# Patient Record
Sex: FEMALE | Race: WHITE | HISPANIC OR LATINO
[De-identification: names, ages, dates, MRNs, and addresses within clinical notes are randomized per-mention and may not be internally consistent; named-entity substitution may affect disease eponyms.]

---

## 2017-02-16 ENCOUNTER — APPOINTMENT (OUTPATIENT)
Dept: DERMATOLOGY | Facility: CLINIC | Age: 47
End: 2017-02-16

## 2017-05-02 ENCOUNTER — APPOINTMENT (OUTPATIENT)
Dept: DERMATOLOGY | Facility: CLINIC | Age: 47
End: 2017-05-02

## 2017-11-15 ENCOUNTER — APPOINTMENT (OUTPATIENT)
Dept: COLORECTAL SURGERY | Facility: CLINIC | Age: 47
End: 2017-11-15
Payer: COMMERCIAL

## 2017-11-15 VITALS
WEIGHT: 147 LBS | BODY MASS INDEX: 27.75 KG/M2 | DIASTOLIC BLOOD PRESSURE: 94 MMHG | HEIGHT: 61 IN | SYSTOLIC BLOOD PRESSURE: 150 MMHG | RESPIRATION RATE: 14 BRPM | HEART RATE: 85 BPM | OXYGEN SATURATION: 99 %

## 2017-11-15 PROCEDURE — 99242 OFF/OP CONSLTJ NEW/EST SF 20: CPT

## 2017-11-28 ENCOUNTER — APPOINTMENT (OUTPATIENT)
Dept: CT IMAGING | Facility: CLINIC | Age: 47
End: 2017-11-28
Payer: COMMERCIAL

## 2017-11-28 ENCOUNTER — OUTPATIENT (OUTPATIENT)
Dept: OUTPATIENT SERVICES | Facility: HOSPITAL | Age: 47
LOS: 1 days | End: 2017-11-28
Payer: COMMERCIAL

## 2017-11-28 DIAGNOSIS — Z00.8 ENCOUNTER FOR OTHER GENERAL EXAMINATION: ICD-10-CM

## 2017-11-28 PROCEDURE — 74177 CT ABD & PELVIS W/CONTRAST: CPT

## 2017-11-28 PROCEDURE — 74177 CT ABD & PELVIS W/CONTRAST: CPT | Mod: 26

## 2017-12-04 ENCOUNTER — APPOINTMENT (OUTPATIENT)
Dept: COLORECTAL SURGERY | Facility: CLINIC | Age: 47
End: 2017-12-04

## 2018-01-22 ENCOUNTER — TRANSCRIPTION ENCOUNTER (OUTPATIENT)
Age: 48
End: 2018-01-22

## 2019-12-02 ENCOUNTER — APPOINTMENT (OUTPATIENT)
Dept: HEART AND VASCULAR | Facility: CLINIC | Age: 49
End: 2019-12-02
Payer: COMMERCIAL

## 2019-12-02 VITALS
HEART RATE: 86 BPM | BODY MASS INDEX: 27 KG/M2 | TEMPERATURE: 98.4 F | OXYGEN SATURATION: 98 % | SYSTOLIC BLOOD PRESSURE: 110 MMHG | HEIGHT: 61 IN | WEIGHT: 142.99 LBS | DIASTOLIC BLOOD PRESSURE: 62 MMHG

## 2019-12-02 PROCEDURE — 99204 OFFICE O/P NEW MOD 45 MIN: CPT

## 2019-12-02 PROCEDURE — 93000 ELECTROCARDIOGRAM COMPLETE: CPT

## 2019-12-02 RX ORDER — LOSARTAN POTASSIUM 25 MG/1
25 TABLET, FILM COATED ORAL DAILY
Qty: 90 | Refills: 0 | Status: ACTIVE | COMMUNITY

## 2019-12-02 NOTE — PHYSICAL EXAM
[General Appearance - Well Developed] : well developed [Normal Appearance] : normal appearance [Well Groomed] : well groomed [General Appearance - Well Nourished] : well nourished [No Deformities] : no deformities [General Appearance - In No Acute Distress] : no acute distress [Normal Oral Mucosa] : normal oral mucosa [No Oral Pallor] : no oral pallor [No Oral Cyanosis] : no oral cyanosis [Normal Jugular Venous A Waves Present] : normal jugular venous A waves present [Normal Jugular Venous V Waves Present] : normal jugular venous V waves present [No Jugular Venous Dennis A Waves] : no jugular venous dennis A waves [Heart Rate And Rhythm] : heart rate and rhythm were normal [Heart Sounds] : normal S1 and S2 [Murmurs] : no murmurs present [Respiration, Rhythm And Depth] : normal respiratory rhythm and effort [Auscultation Breath Sounds / Voice Sounds] : lungs were clear to auscultation bilaterally [Exaggerated Use Of Accessory Muscles For Inspiration] : no accessory muscle use [Abdomen Tenderness] : non-tender [Abdomen Soft] : soft [Abdomen Mass (___ Cm)] : no abdominal mass palpated [Abnormal Walk] : normal gait [Gait - Sufficient For Exercise Testing] : the gait was sufficient for exercise testing [Skin Color & Pigmentation] : normal skin color and pigmentation [] : no rash [No Venous Stasis] : no venous stasis [Skin Lesions] : no skin lesions [No Skin Ulcers] : no skin ulcer [No Xanthoma] : no  xanthoma was observed [Affect] : the affect was normal [Oriented To Time, Place, And Person] : oriented to person, place, and time [No Anxiety] : not feeling anxious [Mood] : the mood was normal

## 2019-12-02 NOTE — REASON FOR VISIT
[Initial Evaluation] : an initial evaluation of [Coronary Artery Disease] : coronary artery disease [Peripheral Vascular Disease] : peripheral vascular disease

## 2019-12-02 NOTE — DISCUSSION/SUMMARY
[FreeTextEntry1] : The number of diagnostic and/or management options include:\par \par Chest pain - +carotid disease and fhx, will proceed to CCTA cor and 2d echo to eval anatomy and need for escalated work up\par \par PAD- asymp carotid stenosis, vasc cario referral, continue station and asa therapy\par \par HTN - the patient BP is at goal, ambi bp monitor at goal, no jvd/rale/edema on exam, continue current medicines\par \par CV Prevention - \par ·	Advised SBP guidelines based on ACC/AHA and SPRINT trial increased CAD PAD and mortality \par ·	Assessed willingness to attempt - contemplation stage\par ·	Agree to no added salt and added sugar diet  - prevention medicine referral, nutritionist\par ·	Assist with resources provided - prevention medicine referral, nutritionist, individual counseling\par ·	Arrange ·	Follow-up arranged - next scheduled visit\par \par Prescription drug management: no change\par Review of medicine test: EKG\par Independent review of images: CXR, CT Scan, Stress tests, as applicable\par Review of clinical lab tests: lipid profile, hgA1c, cbc, bnp, metabolic panels, as applicable\par \par \par \par

## 2019-12-02 NOTE — HISTORY OF PRESENT ILLNESS
[FreeTextEntry1] : \par 49 F with known non obstructive carotid disease, here for second opinion. c/o occasional sscp\par \par pnd - no\par orthopnea - no\par leg edema - no\par syncope - no\par dizziness - no\par palpitations - no\par leg pain - no\par SOB - no\par chest pain - YES\par \par location: chest\par duration: seconds\par  modifying factors: none\par timing: spontaeous\par severity: 4/10\par \par EKG NSR normal segmetns and intervals

## 2019-12-26 ENCOUNTER — APPOINTMENT (OUTPATIENT)
Dept: CT IMAGING | Facility: HOSPITAL | Age: 49
End: 2019-12-26
Payer: COMMERCIAL

## 2020-01-12 ENCOUNTER — FORM ENCOUNTER (OUTPATIENT)
Age: 50
End: 2020-01-12

## 2020-01-13 ENCOUNTER — OUTPATIENT (OUTPATIENT)
Dept: OUTPATIENT SERVICES | Facility: HOSPITAL | Age: 50
LOS: 1 days | End: 2020-01-13
Payer: COMMERCIAL

## 2020-01-13 ENCOUNTER — APPOINTMENT (OUTPATIENT)
Dept: CT IMAGING | Facility: HOSPITAL | Age: 50
End: 2020-01-13
Payer: COMMERCIAL

## 2020-01-13 PROCEDURE — 75574 CT ANGIO HRT W/3D IMAGE: CPT

## 2020-01-13 PROCEDURE — 75574 CT ANGIO HRT W/3D IMAGE: CPT | Mod: 26

## 2020-01-15 ENCOUNTER — APPOINTMENT (OUTPATIENT)
Dept: HEART AND VASCULAR | Facility: CLINIC | Age: 50
End: 2020-01-15
Payer: COMMERCIAL

## 2020-01-15 VITALS
WEIGHT: 142.99 LBS | BODY MASS INDEX: 27 KG/M2 | OXYGEN SATURATION: 99 % | HEART RATE: 77 BPM | HEIGHT: 61 IN | DIASTOLIC BLOOD PRESSURE: 66 MMHG | SYSTOLIC BLOOD PRESSURE: 112 MMHG | TEMPERATURE: 98 F

## 2020-01-15 PROCEDURE — 99214 OFFICE O/P EST MOD 30 MIN: CPT

## 2020-01-15 PROCEDURE — 93000 ELECTROCARDIOGRAM COMPLETE: CPT

## 2020-01-16 LAB
25(OH)D3 SERPL-MCNC: 22 NG/ML
ALBUMIN SERPL ELPH-MCNC: 4.7 G/DL
ALP BLD-CCNC: 69 U/L
ALT SERPL-CCNC: 15 U/L
ANION GAP SERPL CALC-SCNC: 13 MMOL/L
APTT BLD: 28.3 SEC
AST SERPL-CCNC: 13 U/L
BASOPHILS # BLD AUTO: 0.06 K/UL
BASOPHILS NFR BLD AUTO: 0.6 %
BILIRUB SERPL-MCNC: 0.5 MG/DL
BUN SERPL-MCNC: 8 MG/DL
CALCIUM SERPL-MCNC: 9.4 MG/DL
CHLORIDE SERPL-SCNC: 105 MMOL/L
CHOLEST SERPL-MCNC: 109 MG/DL
CHOLEST/HDLC SERPL: 2.8 RATIO
CO2 SERPL-SCNC: 21 MMOL/L
CREAT SERPL-MCNC: 0.43 MG/DL
EOSINOPHIL # BLD AUTO: 0.28 K/UL
EOSINOPHIL NFR BLD AUTO: 3 %
ESTIMATED AVERAGE GLUCOSE: 114 MG/DL
GLUCOSE SERPL-MCNC: 82 MG/DL
HBA1C MFR BLD HPLC: 5.6 %
HCT VFR BLD CALC: 38.1 %
HDLC SERPL-MCNC: 39 MG/DL
HGB BLD-MCNC: 12.1 G/DL
IMM GRANULOCYTES NFR BLD AUTO: 0.2 %
INR PPP: 1.04 RATIO
LDLC SERPL CALC-MCNC: 42 MG/DL
LYMPHOCYTES # BLD AUTO: 2.58 K/UL
LYMPHOCYTES NFR BLD AUTO: 27.3 %
MAGNESIUM SERPL-MCNC: 2 MG/DL
MAN DIFF?: NORMAL
MCHC RBC-ENTMCNC: 29.7 PG
MCHC RBC-ENTMCNC: 31.8 GM/DL
MCV RBC AUTO: 93.4 FL
MONOCYTES # BLD AUTO: 0.66 K/UL
MONOCYTES NFR BLD AUTO: 7 %
NEUTROPHILS # BLD AUTO: 5.84 K/UL
NEUTROPHILS NFR BLD AUTO: 61.9 %
PLATELET # BLD AUTO: 532 K/UL
POTASSIUM SERPL-SCNC: 4.3 MMOL/L
PROT SERPL-MCNC: 6.7 G/DL
PT BLD: 11.9 SEC
RBC # BLD: 4.08 M/UL
RBC # FLD: 13.2 %
SODIUM SERPL-SCNC: 140 MMOL/L
TRIGL SERPL-MCNC: 137 MG/DL
TSH SERPL-ACNC: 0.55 UIU/ML
WBC # FLD AUTO: 9.44 K/UL

## 2020-01-17 NOTE — HISTORY OF PRESENT ILLNESS
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  Presents for initial vascular evaluation for carotid and vertebral artery plaque noted on outside MRI 2018.  Had a carotid duplex in Aug 2018 which revealed no significant stenosis (per report, no images).  Had a CCTA recently showed 30-40% left main disease.  Agatson score 68.  No exertional CP/SOB/leg pain. Doesn't exercise but walks ~ 5 miles/day without limitations.  \par \par Hx VTE, CVA, MI\par FHx: father CAD  age 64\par Non smoker, Etoh social\par NKDA\par

## 2020-01-17 NOTE — ASSESSMENT
[FreeTextEntry1] : 49 F\par \par CAD - asymptomatic LM disease 30-40%\par Carotid Plaque\par \par plan: \par - increase lipitor to 40mg\par - cont aspirin\par - check ECHO \par - check carotid dopplers\par - get outside report of MRA

## 2020-02-19 ENCOUNTER — APPOINTMENT (OUTPATIENT)
Dept: HEART AND VASCULAR | Facility: CLINIC | Age: 50
End: 2020-02-19

## 2020-10-12 ENCOUNTER — APPOINTMENT (OUTPATIENT)
Dept: COLORECTAL SURGERY | Facility: CLINIC | Age: 50
End: 2020-10-12

## 2021-04-13 ENCOUNTER — NON-APPOINTMENT (OUTPATIENT)
Age: 51
End: 2021-04-13

## 2021-04-13 ENCOUNTER — APPOINTMENT (OUTPATIENT)
Dept: HEART AND VASCULAR | Facility: CLINIC | Age: 51
End: 2021-04-13
Payer: COMMERCIAL

## 2021-04-13 VITALS
SYSTOLIC BLOOD PRESSURE: 124 MMHG | BODY MASS INDEX: 27 KG/M2 | DIASTOLIC BLOOD PRESSURE: 80 MMHG | OXYGEN SATURATION: 99 % | HEIGHT: 61 IN | HEART RATE: 94 BPM | WEIGHT: 142.99 LBS | TEMPERATURE: 98 F

## 2021-04-13 DIAGNOSIS — J45.909 UNSPECIFIED ASTHMA, UNCOMPLICATED: ICD-10-CM

## 2021-04-13 DIAGNOSIS — I25.10 ATHEROSCLEROTIC HEART DISEASE OF NATIVE CORONARY ARTERY W/OUT ANGINA PECTORIS: ICD-10-CM

## 2021-04-13 DIAGNOSIS — L93.0 DISCOID LUPUS ERYTHEMATOSUS: ICD-10-CM

## 2021-04-13 DIAGNOSIS — I65.29 OCCLUSION AND STENOSIS OF UNSPECIFIED CAROTID ARTERY: ICD-10-CM

## 2021-04-13 PROCEDURE — 93000 ELECTROCARDIOGRAM COMPLETE: CPT

## 2021-04-13 PROCEDURE — 99213 OFFICE O/P EST LOW 20 MIN: CPT

## 2021-04-13 PROCEDURE — 99072 ADDL SUPL MATRL&STAF TM PHE: CPT

## 2021-04-13 NOTE — HISTORY OF PRESENT ILLNESS
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  Presents for initial vascular evaluation for carotid and vertebral artery plaque noted on outside MRI 2018.  Had a carotid duplex in Aug 2018 which revealed no significant stenosis (per report, no images).  Had a CCTA 20 showed 30-40% left main disease.  Agatson score 68.  No exertional CP/SOB/leg pain. Doesn't exercise but walks ~ 5 miles/day without limitations.  \par \par 21: had mild covid infection last month.  notes some recent weight loss, walking a lot more.  no cp or sob.  \par \par Hx VTE, CVA, MI\par FHx: father CAD  age 64\par Non smoker, Etoh social\par NKDA\par

## 2021-04-13 NOTE — PHYSICAL EXAM
[General Appearance - Well Developed] : well developed [Normal Appearance] : normal appearance [Well Groomed] : well groomed [General Appearance - Well Nourished] : well nourished [No Deformities] : no deformities [General Appearance - In No Acute Distress] : no acute distress [Normal Conjunctiva] : the conjunctiva exhibited no abnormalities [Eyelids - No Xanthelasma] : the eyelids demonstrated no xanthelasmas [Normal Oral Mucosa] : normal oral mucosa [No Oral Pallor] : no oral pallor [No Oral Cyanosis] : no oral cyanosis [Normal Jugular Venous A Waves Present] : normal jugular venous A waves present [Normal Jugular Venous V Waves Present] : normal jugular venous V waves present [No Jugular Venous Dennis A Waves] : no jugular venous dennis A waves [Respiration, Rhythm And Depth] : normal respiratory rhythm and effort [Exaggerated Use Of Accessory Muscles For Inspiration] : no accessory muscle use [Auscultation Breath Sounds / Voice Sounds] : lungs were clear to auscultation bilaterally [Heart Rate And Rhythm] : heart rate and rhythm were normal [Heart Sounds] : normal S1 and S2 [Murmurs] : no murmurs present [Abdomen Soft] : soft [Abdomen Tenderness] : non-tender [Abdomen Mass (___ Cm)] : no abdominal mass palpated [Abnormal Walk] : normal gait [Gait - Sufficient For Exercise Testing] : the gait was sufficient for exercise testing [Nail Clubbing] : no clubbing of the fingernails [Cyanosis, Localized] : no localized cyanosis [Petechial Hemorrhages (___cm)] : no petechial hemorrhages [Skin Color & Pigmentation] : normal skin color and pigmentation [] : no rash [No Venous Stasis] : no venous stasis [Skin Lesions] : no skin lesions [No Skin Ulcers] : no skin ulcer [No Xanthoma] : no  xanthoma was observed

## 2021-04-13 NOTE — ASSESSMENT
[FreeTextEntry1] : 50 F\par \par CAD - asymptomatic LM disease 30-40% on CCTA 1/13/20\par Carotid Plaque\par HTN\par EKG NSR\par \par plan: \par - lipitor 40mg\par - cont aspirin\par - cont losartan 25mg \par - obtain CT FFR for mild/mod LM disease \par - check ECHO \par - check carotid dopplers\par

## 2021-04-16 ENCOUNTER — NON-APPOINTMENT (OUTPATIENT)
Age: 51
End: 2021-04-16

## 2021-12-25 ENCOUNTER — EMERGENCY (EMERGENCY)
Facility: HOSPITAL | Age: 51
LOS: 1 days | Discharge: LEFT BEFORE DISCHARGE | End: 2021-12-25
Attending: EMERGENCY MEDICINE
Payer: COMMERCIAL

## 2021-12-25 VITALS
HEART RATE: 86 BPM | HEIGHT: 61 IN | TEMPERATURE: 99 F | WEIGHT: 141.1 LBS | SYSTOLIC BLOOD PRESSURE: 149 MMHG | OXYGEN SATURATION: 99 % | DIASTOLIC BLOOD PRESSURE: 93 MMHG | RESPIRATION RATE: 17 BRPM

## 2021-12-25 PROCEDURE — 99284 EMERGENCY DEPT VISIT MOD MDM: CPT

## 2021-12-25 PROCEDURE — 99284 EMERGENCY DEPT VISIT MOD MDM: CPT | Mod: 25

## 2021-12-25 PROCEDURE — 93971 EXTREMITY STUDY: CPT | Mod: 26,RT

## 2021-12-25 PROCEDURE — 93971 EXTREMITY STUDY: CPT

## 2021-12-25 RX ORDER — IBUPROFEN 200 MG
600 TABLET ORAL ONCE
Refills: 0 | Status: COMPLETED | OUTPATIENT
Start: 2021-12-25 | End: 2021-12-25

## 2021-12-25 RX ADMIN — Medication 600 MILLIGRAM(S): at 13:56

## 2021-12-25 NOTE — ED PROVIDER NOTE - PHYSICAL EXAMINATION
General: In NAD, non-toxic/well-appearing; well nourished/developed.  Skin: No rashes or lesions. Warm, dry, color normal for race. See MSK.   Head: Normocephalic/atraumatic.   Eyes: Sclera anicteric, conjunctivae clear b/l. PERRLA, EOMI.  Neck: Supple, FROM.   Cardio: Rate and rhythm regular. No audible murmur, gallop, or rub.  PV: No edema of feet/ankles. No calf swelling/tenderness. Pulses: b/l 2+ DP, PT. Capillary refill <2 seconds.  Resp: Breath sounds vesicular, symmetrical and without rales, rhonchi or wheezing b/l.  MSK: MAEx4. FROM. No bruising. +Spider vein to posterior lateral right knee. Approx. 1cm x 1.cm  firm, tender mass to posterior lateral right knee.   Neuro: A&Ox3. No motor/sensory deficits. General: In NAD, non-toxic/well-appearing; well nourished/developed.  Skin: No rashes or lesions. Warm, dry, color normal for race. See MSK.   Head: Normocephalic/atraumatic.   Eyes: Sclera anicteric, conjunctivae clear b/l. PERRLA, EOMI.  Neck: Supple, FROM.   Cardio: Rate and rhythm regular. No audible murmur, gallop, or rub.  PV: No edema of feet/ankles. No calf swelling/tenderness. Pulses: b/l 2+ DP, PT. Capillary refill <2 seconds.  Resp: Breath sounds vesicular, symmetrical and without rales, rhonchi or wheezing b/l.  MSK: MAEx4. FROM. No bruising. +Spider vein to posterior lateral right knee. Approx. 1cm x 1.cm firm, tender mass to posterior lateral right knee. No fluctuance, erythema, swelling.   Neuro: A&Ox3. No motor/sensory deficits. General: In NAD, non-toxic/well-appearing; well nourished/developed.  Skin: No rashes or lesions. Warm, dry, color normal for race. See MSK.   Head: Normocephalic/atraumatic.   Eyes: Sclera anicteric, conjunctivae clear b/l. PERRLA, EOMI.  Neck: Supple, FROM.   Cardio: Rate and rhythm regular. No audible murmur, gallop, or rub.  PV: No edema of feet/ankles. No calf swelling/tenderness. Pulses: b/l 2+ DP, PT. Capillary refill <2 seconds.  Resp: Breath sounds vesicular, symmetrical and without rales, rhonchi or wheezing b/l.  MSK: MAEx4. FROM. No bruising. +Spider vein to posterior lateral right knee. Approx. 1cm x 1.cm firm, tender mass to posterior lateral right knee. No fluctuance, erythema, swelling.   Neuro: A&Ox3. No motor/sensory deficits. Ambulatory.

## 2021-12-25 NOTE — ED PROVIDER NOTE - CLINICAL SUMMARY MEDICAL DECISION MAKING FREE TEXT BOX
50 yo female PMHx asthma, chronic back pain, GERD, incisional hernia, small bowel perforation, vein striping presents to ED c/o bump behind right knee x1 day. No abscess, erythema, bruising. US negative. Patient to be discharged. Patient provided verbal/written discharge instructions and return precautions. Patient expresses understanding and agreement.

## 2021-12-25 NOTE — ED PROVIDER NOTE - PATIENT PORTAL LINK FT
You can access the FollowMyHealth Patient Portal offered by [facility] by registering at the following website: [email or web address]. By joining [facility]’s FollowMyHealth portal, you will also be able to view your health information using other applications (apps) compatible with our system.

## 2021-12-25 NOTE — ED PROVIDER NOTE - OBJECTIVE STATEMENT
52 yo female PMHx asthma, chronic back pain, GERD, incisional hernia, small bowel perforation, vein striping presents to ED c/o bump behind right knee x1 day. Associated with bruise. Did not self medicate PTA. Made worse with movement. No further complaints at this time.   Denies recent travel, OCP/hormone use, fevers, trauma.

## 2021-12-25 NOTE — ED PROVIDER NOTE - NSICDXPASTMEDICALHX_GEN_ALL_CORE_FT
PAST MEDICAL HISTORY:  Asthma Mild    Chronic back pain Lumbar. Treated every 3 months by Neurosurgeon. Medically managed    Discoid lupus dx:  ' 2008    Diverticulosis     Dry eyes     Fibroid uterus ' 2004    Gastric ulcer ' 99.  Healed    GERD (gastroesophageal reflux disease)     Herniated lumbar intervertebral disc ' 2012    Incisional hernia     Metatarsal fracture ' 1997:  Left Foot    Postoperative wound infection dx: 11/2013:   Infected Mesh ( s/p Umbilical Hernia Repair: ' 04)    Scar tissue dx: 10/2013:  + Scar Tissue @ site of prior Umbilical Hernia Repair    Small bowel perforation dx:  11/2013    Umbilical hernia ' 2004

## 2021-12-25 NOTE — ED PROVIDER NOTE - NSFOLLOWUPINSTRUCTIONS_ED_ALL_ED_FT
- Ibuprofen 600mg every 6 hours as needed for pain.  - Acetaminophen 650mg every 6 hours as needed for pain.   - Please bring all documentation from your ED visit to any related future follow up appointment.  - Please call to schedule follow up appointment with your primary care physician within 24-48 hours.  - Please seek immediate medical attention or return to the ED for any new/worsening, signs/symptoms, or concerns.    Feel better!

## 2021-12-25 NOTE — ED PROVIDER NOTE - NSICDXPASTSURGICALHX_GEN_ALL_CORE_FT
PAST SURGICAL HISTORY:  Benign Breast Biopsy ' :  Left    History of  /  /  2004    Metatarsal fracture ' :  Closed Reduction Left Metatarsal Fracture    S/P colonoscopy ' .  dx: Diverticulosis    S/P hernia repair ':  Umbilical Hernia Repair with Mesh    S/P laminectomy ' :  Lumbar Laminectomy    S/P myomectomy 2000    S/P MICHAEL (total abdominal hysterectomy) ' . Benign    S/P tonsillectomy age 20    Scar tissue 10/2013:  Removal Scar Tissue @ site of prior Umbilica Hernia Repair

## 2021-12-25 NOTE — ED PROVIDER NOTE - CARE PROVIDER_API CALL
Gonzalez Stephen)  Orthopaedic Surgery  [address] [place] [address]  Phone: [phone]  Fax: [phone]  Follow Up Time:

## 2021-12-25 NOTE — ED PROVIDER NOTE - PROGRESS NOTE DETAILS
HILARY Mariano: Unable to locate patient despite multiple attempts. Patient did not receive US results or follow up care. Patient eloped, no IV in place.

## 2022-03-21 ENCOUNTER — NON-APPOINTMENT (OUTPATIENT)
Age: 52
End: 2022-03-21

## 2022-03-21 ENCOUNTER — APPOINTMENT (OUTPATIENT)
Dept: FAMILY MEDICINE | Facility: CLINIC | Age: 52
End: 2022-03-21
Payer: COMMERCIAL

## 2022-03-21 VITALS
BODY MASS INDEX: 28.32 KG/M2 | HEART RATE: 84 BPM | OXYGEN SATURATION: 98 % | WEIGHT: 150 LBS | RESPIRATION RATE: 16 BRPM | TEMPERATURE: 97.9 F | DIASTOLIC BLOOD PRESSURE: 74 MMHG | HEIGHT: 61 IN | SYSTOLIC BLOOD PRESSURE: 116 MMHG

## 2022-03-21 PROCEDURE — 00018S: CUSTOM

## 2022-03-21 PROCEDURE — 00012S: CUSTOM

## 2022-03-21 PROCEDURE — 00011S: CUSTOM

## 2022-03-21 PROCEDURE — 00002S: CUSTOM

## 2022-03-21 RX ORDER — BUDESONIDE AND FORMOTEROL FUMARATE DIHYDRATE 160; 4.5 UG/1; UG/1
160-4.5 AEROSOL RESPIRATORY (INHALATION)
Qty: 10 | Refills: 0 | Status: ACTIVE | COMMUNITY
Start: 2021-12-24

## 2022-03-21 RX ORDER — TRIAMCINOLONE ACETONIDE 1 MG/G
0.1 OINTMENT TOPICAL
Qty: 80 | Refills: 0 | Status: ACTIVE | COMMUNITY
Start: 2022-02-17

## 2022-11-02 ENCOUNTER — APPOINTMENT (OUTPATIENT)
Dept: COLORECTAL SURGERY | Facility: CLINIC | Age: 52
End: 2022-11-02

## 2022-11-14 ENCOUNTER — APPOINTMENT (OUTPATIENT)
Dept: COLORECTAL SURGERY | Facility: CLINIC | Age: 52
End: 2022-11-14

## 2023-04-03 ENCOUNTER — APPOINTMENT (OUTPATIENT)
Dept: FAMILY MEDICINE | Facility: CLINIC | Age: 53
End: 2023-04-03
Payer: COMMERCIAL

## 2023-04-03 ENCOUNTER — NON-APPOINTMENT (OUTPATIENT)
Age: 53
End: 2023-04-03

## 2023-04-03 VITALS
WEIGHT: 9.38 LBS | BODY MASS INDEX: 1.77 KG/M2 | HEART RATE: 100 BPM | RESPIRATION RATE: 14 BRPM | OXYGEN SATURATION: 98 % | SYSTOLIC BLOOD PRESSURE: 110 MMHG | DIASTOLIC BLOOD PRESSURE: 70 MMHG

## 2023-04-03 DIAGNOSIS — Z83.49 FAMILY HISTORY OF OTHER ENDOCRINE, NUTRITIONAL AND METABOLIC DISEASES: ICD-10-CM

## 2023-04-03 DIAGNOSIS — Z83.3 FAMILY HISTORY OF DIABETES MELLITUS: ICD-10-CM

## 2023-04-03 DIAGNOSIS — Z78.9 OTHER SPECIFIED HEALTH STATUS: ICD-10-CM

## 2023-04-03 DIAGNOSIS — Z81.8 FAMILY HISTORY OF OTHER MENTAL AND BEHAVIORAL DISORDERS: ICD-10-CM

## 2023-04-03 DIAGNOSIS — Z00.00 ENCOUNTER FOR GENERAL ADULT MEDICAL EXAMINATION W/OUT ABNORMAL FINDINGS: ICD-10-CM

## 2023-04-03 DIAGNOSIS — Z82.49 FAMILY HISTORY OF ISCHEMIC HEART DISEASE AND OTHER DISEASES OF THE CIRCULATORY SYSTEM: ICD-10-CM

## 2023-04-03 PROCEDURE — 00002S: CUSTOM

## 2023-04-03 PROCEDURE — 00009S: CUSTOM

## 2023-04-03 PROCEDURE — 00011S: CUSTOM

## 2023-04-03 PROCEDURE — 00012S: CUSTOM

## 2023-04-03 PROCEDURE — 00018S: CUSTOM

## 2023-04-03 RX ORDER — ASPIRIN 81 MG/1
81 TABLET, COATED ORAL
Qty: 30 | Refills: 0 | Status: COMPLETED | COMMUNITY
Start: 2022-01-25 | End: 2023-04-03

## 2023-04-03 RX ORDER — CEPHALEXIN 500 MG/1
500 CAPSULE ORAL
Qty: 10 | Refills: 0 | Status: COMPLETED | COMMUNITY
Start: 2022-01-13 | End: 2023-04-03

## 2023-04-03 RX ORDER — PREDNISONE 20 MG/1
20 TABLET ORAL
Qty: 10 | Refills: 0 | Status: COMPLETED | COMMUNITY
Start: 2023-03-05

## 2023-04-03 RX ORDER — OSELTAMIVIR PHOSPHATE 75 MG/1
75 CAPSULE ORAL
Qty: 10 | Refills: 0 | Status: COMPLETED | COMMUNITY
Start: 2022-12-19

## 2023-04-03 RX ORDER — MELOXICAM 15 MG/1
15 TABLET ORAL
Qty: 30 | Refills: 0 | Status: COMPLETED | COMMUNITY
Start: 2022-10-13

## 2023-04-03 RX ORDER — METHYLPREDNISOLONE 4 MG/1
4 TABLET ORAL
Qty: 21 | Refills: 0 | Status: COMPLETED | COMMUNITY
Start: 2021-12-28 | End: 2023-04-03

## 2023-04-03 RX ORDER — AZITHROMYCIN 500 MG/1
500 TABLET, FILM COATED ORAL
Qty: 5 | Refills: 0 | Status: COMPLETED | COMMUNITY
Start: 2021-11-06 | End: 2023-04-03

## 2023-04-03 RX ORDER — METAXALONE 800 MG/1
800 TABLET ORAL
Qty: 45 | Refills: 0 | Status: COMPLETED | COMMUNITY
Start: 2022-10-14

## 2023-04-03 RX ORDER — COVID-19 ANTIGEN TEST
KIT MISCELLANEOUS
Qty: 8 | Refills: 0 | Status: COMPLETED | COMMUNITY
Start: 2023-03-22

## 2023-04-03 RX ORDER — CLOBETASOL PROPIONATE 0.5 MG/G
0.05 OINTMENT TOPICAL
Qty: 45 | Refills: 0 | Status: COMPLETED | COMMUNITY
Start: 2022-12-27

## 2023-04-03 RX ORDER — AMOXICILLIN AND CLAVULANATE POTASSIUM 875; 125 MG/1; MG/1
875-125 TABLET, COATED ORAL
Qty: 28 | Refills: 0 | Status: COMPLETED | COMMUNITY
Start: 2021-12-28 | End: 2023-04-03

## 2023-04-03 RX ORDER — IBUPROFEN 800 MG/1
800 TABLET, FILM COATED ORAL
Qty: 30 | Refills: 0 | Status: COMPLETED | COMMUNITY
Start: 2021-11-06 | End: 2023-04-03

## 2023-04-03 RX ORDER — BETAMETHASONE VALERATE 1 MG/G
0.1 CREAM TOPICAL
Qty: 60 | Refills: 0 | Status: COMPLETED | COMMUNITY
Start: 2022-03-15 | End: 2023-04-03

## 2023-04-03 RX ORDER — ASPIRIN 81 MG
81 TABLET, DELAYED RELEASE (ENTERIC COATED) ORAL DAILY
Refills: 0 | Status: COMPLETED | COMMUNITY
End: 2023-04-03

## 2023-04-03 RX ORDER — GABAPENTIN 100 MG/1
100 CAPSULE ORAL
Qty: 90 | Refills: 0 | Status: ACTIVE | COMMUNITY
Start: 2022-10-18

## 2023-04-03 RX ORDER — FLUTICASONE FUROATE AND VILANTEROL TRIFENATATE 100; 25 UG/1; UG/1
100-25 POWDER RESPIRATORY (INHALATION)
Refills: 0 | Status: COMPLETED | COMMUNITY
End: 2023-04-03

## 2023-04-03 RX ORDER — FLUCONAZOLE 150 MG/1
150 TABLET ORAL
Qty: 2 | Refills: 0 | Status: COMPLETED | COMMUNITY
Start: 2021-12-28 | End: 2023-04-03

## 2023-04-03 RX ORDER — DICLOFENAC SODIUM 50 MG/1
50 TABLET, DELAYED RELEASE ORAL
Qty: 30 | Refills: 0 | Status: ACTIVE | COMMUNITY
Start: 2023-03-27

## 2023-04-03 RX ORDER — NAPROXEN 375 MG/1
375 TABLET ORAL
Qty: 60 | Refills: 0 | Status: COMPLETED | COMMUNITY
Start: 2022-12-02

## 2023-04-03 RX ORDER — LIDOCAINE 4% 4 G/100G
4 PATCH TOPICAL
Qty: 45 | Refills: 0 | Status: COMPLETED | COMMUNITY
Start: 2022-11-22

## 2023-04-03 RX ORDER — AZITHROMYCIN 250 MG/1
250 TABLET, FILM COATED ORAL
Qty: 6 | Refills: 0 | Status: COMPLETED | COMMUNITY
Start: 2021-12-06 | End: 2023-04-03

## 2023-04-03 RX ORDER — METHOCARBAMOL 500 MG/1
500 TABLET, FILM COATED ORAL
Qty: 30 | Refills: 0 | Status: ACTIVE | COMMUNITY
Start: 2023-03-16

## 2023-04-03 RX ORDER — DICLOFENAC SODIUM 75 MG/1
75 TABLET, DELAYED RELEASE ORAL
Qty: 30 | Refills: 0 | Status: COMPLETED | COMMUNITY
Start: 2022-01-13 | End: 2023-04-03

## 2023-04-03 RX ORDER — ERGOCALCIFEROL 1.25 MG/1
1.25 MG CAPSULE, LIQUID FILLED ORAL
Qty: 1 | Refills: 0 | Status: ACTIVE | COMMUNITY
Start: 2023-03-27

## 2023-04-03 RX ORDER — NAPROXEN SODIUM 375 MG/1
375 TABLET, FILM COATED, EXTENDED RELEASE ORAL
Qty: 60 | Refills: 0 | Status: COMPLETED | COMMUNITY
Start: 2022-11-22

## 2023-04-03 RX ORDER — UBIDECARENONE 400 MG
400 CAPSULE ORAL
Refills: 0 | Status: COMPLETED | COMMUNITY
End: 2023-04-03

## 2023-04-03 RX ORDER — LIDOCAINE HYDROCHLORIDE 20 MG/ML
2 SOLUTION ORAL; TOPICAL
Qty: 150 | Refills: 0 | Status: ACTIVE | COMMUNITY
Start: 2023-03-23

## 2023-04-03 RX ORDER — BENZONATATE 200 MG/1
200 CAPSULE ORAL
Qty: 45 | Refills: 0 | Status: COMPLETED | COMMUNITY
Start: 2022-12-29

## 2023-04-03 RX ORDER — ATORVASTATIN CALCIUM 10 MG/1
10 TABLET, FILM COATED ORAL
Qty: 180 | Refills: 0 | Status: ACTIVE | COMMUNITY
Start: 2022-10-13

## 2023-04-03 RX ORDER — ATORVASTATIN CALCIUM 20 MG/1
20 TABLET, FILM COATED ORAL DAILY
Refills: 0 | Status: COMPLETED | COMMUNITY
End: 2023-04-03

## 2023-04-03 RX ORDER — PREDNISONE 5 MG/1
5 TABLET ORAL
Qty: 10 | Refills: 0 | Status: COMPLETED | COMMUNITY
Start: 2022-10-13

## 2023-04-03 RX ORDER — FLUTICASONE PROPIONATE 50 UG/1
50 SPRAY, METERED NASAL
Qty: 16 | Refills: 0 | Status: COMPLETED | COMMUNITY
Start: 2021-11-06 | End: 2023-04-03

## 2023-04-05 ENCOUNTER — OFFICE (OUTPATIENT)
Dept: URBAN - METROPOLITAN AREA CLINIC 94 | Facility: CLINIC | Age: 53
Setting detail: OPHTHALMOLOGY
End: 2023-04-05
Payer: COMMERCIAL

## 2023-04-05 DIAGNOSIS — H52.4: ICD-10-CM

## 2023-04-05 DIAGNOSIS — Z79.899: ICD-10-CM

## 2023-04-05 DIAGNOSIS — H25.13: ICD-10-CM

## 2023-04-05 DIAGNOSIS — T37.8X5D: ICD-10-CM

## 2023-04-05 DIAGNOSIS — H04.123: ICD-10-CM

## 2023-04-05 DIAGNOSIS — H35.3131: ICD-10-CM

## 2023-04-05 PROCEDURE — 92014 COMPRE OPH EXAM EST PT 1/>: CPT | Performed by: OPHTHALMOLOGY

## 2023-04-05 PROCEDURE — 92083 EXTENDED VISUAL FIELD XM: CPT | Performed by: OPHTHALMOLOGY

## 2023-04-05 PROCEDURE — 92133 CPTRZD OPH DX IMG PST SGM ON: CPT | Performed by: OPHTHALMOLOGY

## 2023-04-05 ASSESSMENT — AXIALLENGTH_DERIVED
OD_AL: 22.7377
OS_AL: 23.0921

## 2023-04-05 ASSESSMENT — KERATOMETRY
OS_K1POWER_DIOPTERS: 44.50
OD_K2POWER_DIOPTERS: 45.50
OD_K1POWER_DIOPTERS: 45.00
OS_AXISANGLE_DEGREES: 112
OS_K2POWER_DIOPTERS: 44.75
METHOD_AUTO_MANUAL: AUTO
OD_AXISANGLE_DEGREES: 108

## 2023-04-05 ASSESSMENT — REFRACTION_MANIFEST
OD_VA2: 20/20
OS_VA2: 20/20
OD_VA1: 20/20
OD_SPHERE: PLANO
OS_SPHERE: PLANO
OD_ADD: +1.00
OS_VA1: 20/20
OS_ADD: +1.00

## 2023-04-05 ASSESSMENT — TONOMETRY
OD_IOP_MMHG: 17
OS_IOP_MMHG: 17

## 2023-04-05 ASSESSMENT — SUPERFICIAL PUNCTATE KERATITIS (SPK)
OD_SPK: +1
OS_SPK: +1

## 2023-04-05 ASSESSMENT — REFRACTION_AUTOREFRACTION
OS_SPHERE: +0.50
OD_SPHERE: +1.00
OD_CYLINDER: -0.75
OS_AXIS: 084
OD_AXIS: 089
OS_CYLINDER: -0.50

## 2023-04-05 ASSESSMENT — SPHEQUIV_DERIVED
OD_SPHEQUIV: 0.625
OS_SPHEQUIV: 0.25

## 2023-04-05 ASSESSMENT — VISUAL ACUITY
OD_BCVA: 20/20
OS_BCVA: 20/20

## 2023-04-05 ASSESSMENT — CONFRONTATIONAL VISUAL FIELD TEST (CVF)
OD_FINDINGS: FULL
OS_FINDINGS: FULL

## 2023-05-04 ENCOUNTER — APPOINTMENT (OUTPATIENT)
Dept: OBGYN | Facility: CLINIC | Age: 53
End: 2023-05-04
Payer: COMMERCIAL

## 2023-05-04 ENCOUNTER — NON-APPOINTMENT (OUTPATIENT)
Age: 53
End: 2023-05-04

## 2023-05-04 VITALS
BODY MASS INDEX: 28.89 KG/M2 | DIASTOLIC BLOOD PRESSURE: 78 MMHG | WEIGHT: 153 LBS | SYSTOLIC BLOOD PRESSURE: 139 MMHG | HEIGHT: 61 IN

## 2023-05-04 PROCEDURE — 99203 OFFICE O/P NEW LOW 30 MIN: CPT

## 2023-05-04 RX ORDER — AMOXICILLIN 500 MG/1
500 CAPSULE ORAL
Qty: 21 | Refills: 0 | Status: DISCONTINUED | COMMUNITY
Start: 2023-03-23 | End: 2023-05-04

## 2023-05-04 RX ORDER — AMOXICILLIN 875 MG/1
875 TABLET, FILM COATED ORAL
Qty: 20 | Refills: 0 | Status: DISCONTINUED | COMMUNITY
Start: 2021-10-28 | End: 2023-05-04

## 2023-05-18 ENCOUNTER — APPOINTMENT (OUTPATIENT)
Dept: OBGYN | Facility: CLINIC | Age: 53
End: 2023-05-18

## 2023-05-24 ENCOUNTER — APPOINTMENT (OUTPATIENT)
Dept: OBGYN | Facility: CLINIC | Age: 53
End: 2023-05-24
Payer: COMMERCIAL

## 2023-05-24 ENCOUNTER — ASOB RESULT (OUTPATIENT)
Age: 53
End: 2023-05-24

## 2023-05-24 ENCOUNTER — APPOINTMENT (OUTPATIENT)
Dept: ANTEPARTUM | Facility: CLINIC | Age: 53
End: 2023-05-24
Payer: COMMERCIAL

## 2023-05-24 VITALS
HEIGHT: 61 IN | WEIGHT: 153 LBS | SYSTOLIC BLOOD PRESSURE: 136 MMHG | BODY MASS INDEX: 28.89 KG/M2 | DIASTOLIC BLOOD PRESSURE: 85 MMHG

## 2023-05-24 DIAGNOSIS — B37.31 ACUTE CANDIDIASIS OF VULVA AND VAGINA: ICD-10-CM

## 2023-05-24 DIAGNOSIS — N93.9 ABNORMAL UTERINE AND VAGINAL BLEEDING, UNSPECIFIED: ICD-10-CM

## 2023-05-24 PROCEDURE — 76830 TRANSVAGINAL US NON-OB: CPT

## 2023-05-24 PROCEDURE — 76857 US EXAM PELVIC LIMITED: CPT | Mod: 59

## 2023-05-24 PROCEDURE — 99213 OFFICE O/P EST LOW 20 MIN: CPT

## 2023-05-24 RX ORDER — FLUCONAZOLE 150 MG/1
150 TABLET ORAL
Qty: 2 | Refills: 0 | Status: ACTIVE | COMMUNITY
Start: 2023-05-24 | End: 1900-01-01

## 2023-05-24 NOTE — HISTORY OF PRESENT ILLNESS
[Patient reported PAP Smear was normal] : Patient reported PAP Smear was normal [Y] : Positive pregnancy history [Menarche Age: ____] : age at menarche was [unfilled] [No] : Patient does not have concerns regarding sex [Currently Active] : currently active [Ultrasound] : ultrasound [TextBox_4] : c/o left sided pressure and discomfort\par had vaginal bleeding- pt with h/o supracervical hyst for fibroid uterus. [Mammogramdate] : 3/10/23 [TextBox_19] : br2 [PapSmeardate] : 3/2023 [TextBox_31] : as per  pt [ColonoscopyDate] : 1/1/2013 [LMPDate] : 2004 [PGHxTotal] : 3 [[facility]] : 3 [[facility]iving] : 3 [FreeTextEntry1] : 2004

## 2023-05-24 NOTE — PLAN
[FreeTextEntry1] : Ultrasound reviewed with patient\par B/l ovarian cysts noted left sided hydrosalpinx, subserosal fibroid noted on cervix- likely cause of bleeding. \par Options reviewed with patient\par Pt would to conservatively monitor at this time\par PRecautions reviewed and short interval followup discussed.

## 2023-06-08 NOTE — PLAN
[FreeTextEntry1] : Mammo uptodate\par Pt's ultrasound from previous gYN reviewed- hemorrhagic cyst on right ovary, left hydrosalpinx noted.\par \par Plan for repeat US at office for confirmation and determination of next step.

## 2023-06-08 NOTE — HISTORY OF PRESENT ILLNESS
[Patient reported PAP Smear was normal] : Patient reported PAP Smear was normal [Y] : Positive pregnancy history [Menarche Age: ____] : age at menarche was [unfilled] [No] : Patient does not have concerns regarding sex [Currently Active] : currently active [Ultrasound] : ultrasound [TextBox_4] : irregular bleeding\par s/p hysterecrtomy 2004\par Recently passing clots \par had supracervical hyst [Mammogramdate] : 3/2023 [TextBox_19] : as per pt [BreastSonogramDate] : 3/2023 [TextBox_25] : as per pt [PapSmeardate] : 3/2023 [TextBox_31] : as per pt [ColonoscopyDate] : 1/1/2013 [HPVDate] : 3/2023 [TextBox_78] : neg as per pt [LMPDate] : 2004 [PGHxTotal] : 3 [[facility] HomelTerm] : 3 [[facility] Centeriving] : 3 [FreeTextEntry1] : 2004

## 2023-06-08 NOTE — PHYSICAL EXAM
[Labia Majora] : normal [Labia Minora] : normal [Normal] : normal [Absent] : absent [Uterine Adnexae] : normal [FreeTextEntry8] : unremarkable examination

## 2023-10-10 ENCOUNTER — APPOINTMENT (OUTPATIENT)
Dept: OBGYN | Facility: CLINIC | Age: 53
End: 2023-10-10
Payer: COMMERCIAL

## 2023-10-10 VITALS
BODY MASS INDEX: 28.32 KG/M2 | SYSTOLIC BLOOD PRESSURE: 114 MMHG | WEIGHT: 150 LBS | TEMPERATURE: 97 F | DIASTOLIC BLOOD PRESSURE: 68 MMHG | HEIGHT: 61 IN

## 2023-10-10 DIAGNOSIS — N64.4 MASTODYNIA: ICD-10-CM

## 2023-10-10 PROCEDURE — 99213 OFFICE O/P EST LOW 20 MIN: CPT

## 2023-11-07 ENCOUNTER — NON-APPOINTMENT (OUTPATIENT)
Age: 53
End: 2023-11-07

## 2023-11-27 ENCOUNTER — APPOINTMENT (OUTPATIENT)
Dept: ANTEPARTUM | Facility: CLINIC | Age: 53
End: 2023-11-27

## 2023-11-29 ENCOUNTER — APPOINTMENT (OUTPATIENT)
Dept: ANTEPARTUM | Facility: CLINIC | Age: 53
End: 2023-11-29
Payer: COMMERCIAL

## 2023-11-29 ENCOUNTER — APPOINTMENT (OUTPATIENT)
Dept: OBGYN | Facility: CLINIC | Age: 53
End: 2023-11-29
Payer: COMMERCIAL

## 2023-11-29 ENCOUNTER — ASOB RESULT (OUTPATIENT)
Age: 53
End: 2023-11-29

## 2023-11-29 VITALS
HEIGHT: 61 IN | WEIGHT: 151 LBS | SYSTOLIC BLOOD PRESSURE: 120 MMHG | BODY MASS INDEX: 28.51 KG/M2 | DIASTOLIC BLOOD PRESSURE: 76 MMHG

## 2023-11-29 DIAGNOSIS — N91.2 AMENORRHEA, UNSPECIFIED: ICD-10-CM

## 2023-11-29 DIAGNOSIS — R10.2 PELVIC AND PERINEAL PAIN: ICD-10-CM

## 2023-11-29 DIAGNOSIS — N70.11 CHRONIC SALPINGITIS: ICD-10-CM

## 2023-11-29 PROCEDURE — 76830 TRANSVAGINAL US NON-OB: CPT

## 2023-11-29 PROCEDURE — 99214 OFFICE O/P EST MOD 30 MIN: CPT

## 2023-11-29 PROCEDURE — 76857 US EXAM PELVIC LIMITED: CPT | Mod: 59

## 2023-11-29 PROCEDURE — 36415 COLL VENOUS BLD VENIPUNCTURE: CPT

## 2023-11-30 LAB
ESTRADIOL SERPL-MCNC: 129 PG/ML
FSH SERPL-MCNC: 37.2 IU/L
LH SERPL-ACNC: 44.5 IU/L
PROGEST SERPL-MCNC: 0.4 NG/ML
PROLACTIN SERPL-MCNC: 18.2 NG/ML
TSH SERPL-ACNC: 1.21 UIU/ML

## 2023-12-28 DIAGNOSIS — N64.4 MASTODYNIA: ICD-10-CM

## 2023-12-28 PROBLEM — N70.11 HYDROSALPINX: Status: ACTIVE | Noted: 2023-06-08

## 2023-12-28 PROBLEM — R10.2 PELVIC PAIN: Status: ACTIVE | Noted: 2023-05-24

## 2023-12-28 NOTE — HISTORY OF PRESENT ILLNESS
[TextBox_4] : Pelvic Ultrasound Breast Pain [Mammogramdate] : 08/10/23 [TextBox_19] : BR2 [BreastSonogramDate] : 08/10/23 [TextBox_25] : BR2 [PapSmeardate] : 03/01/23 [TextBox_31] : PER PT  [ColonoscopyDate] : 01/2013 [LMPDate] : 2004 [de-identified] : had a Hysterectomy [PGHxTotal] : 3 [[facility] HospitallTerm] : 3 [[facility] Centeriving] : 3 [Ultrasound] : ultrasound [FreeTextEntry1] : 2004

## 2023-12-28 NOTE — PLAN
[FreeTextEntry1] : Breast Pain Negative Mammo/Breast US Discussed Vitamin E for pain relief  Pelvic Pain Stable Hydrosalpinx  No further bleeding.  Discussed monitoring of hydrosalpinx. Possible surgery if continues to have pelvic.   Plan for well woman visit 3/2024.

## 2024-10-28 ENCOUNTER — NON-APPOINTMENT (OUTPATIENT)
Age: 54
End: 2024-10-28

## 2024-11-04 ENCOUNTER — OFFICE (OUTPATIENT)
Dept: URBAN - METROPOLITAN AREA CLINIC 94 | Facility: CLINIC | Age: 54
Setting detail: OPHTHALMOLOGY
End: 2024-11-04
Payer: COMMERCIAL

## 2024-11-04 DIAGNOSIS — Z79.899: ICD-10-CM

## 2024-11-04 DIAGNOSIS — H35.3131: ICD-10-CM

## 2024-11-04 PROCEDURE — 92134 CPTRZ OPH DX IMG PST SGM RTA: CPT | Performed by: SPECIALIST

## 2024-11-04 PROCEDURE — 92235 FLUORESCEIN ANGRPH MLTIFRAME: CPT | Performed by: SPECIALIST

## 2024-11-04 PROCEDURE — 92083 EXTENDED VISUAL FIELD XM: CPT | Performed by: SPECIALIST

## 2024-11-04 PROCEDURE — 92014 COMPRE OPH EXAM EST PT 1/>: CPT | Performed by: SPECIALIST

## 2024-11-04 ASSESSMENT — REFRACTION_AUTOREFRACTION
OD_CYLINDER: -0.75
OS_CYLINDER: -0.50
OD_AXIS: 089
OS_AXIS: 084
OS_SPHERE: +0.50
OD_SPHERE: +1.00

## 2024-11-04 ASSESSMENT — KERATOMETRY
OD_K1POWER_DIOPTERS: 45.00
OD_AXISANGLE_DEGREES: 108
OS_K2POWER_DIOPTERS: 44.75
METHOD_AUTO_MANUAL: AUTO
OS_K1POWER_DIOPTERS: 44.50
OS_AXISANGLE_DEGREES: 112
OD_K2POWER_DIOPTERS: 45.50

## 2024-11-04 ASSESSMENT — SUPERFICIAL PUNCTATE KERATITIS (SPK)
OS_SPK: +1
OD_SPK: +1

## 2024-11-04 ASSESSMENT — CONFRONTATIONAL VISUAL FIELD TEST (CVF)
OD_FINDINGS: FULL
OS_FINDINGS: FULL

## 2024-11-04 ASSESSMENT — VISUAL ACUITY
OS_BCVA: 20/20-1
OD_BCVA: 20/30+1

## 2024-11-13 ENCOUNTER — NON-APPOINTMENT (OUTPATIENT)
Age: 54
End: 2024-11-13

## 2024-11-13 ENCOUNTER — APPOINTMENT (OUTPATIENT)
Dept: OBGYN | Facility: CLINIC | Age: 54
End: 2024-11-13
Payer: COMMERCIAL

## 2024-11-13 VITALS
DIASTOLIC BLOOD PRESSURE: 72 MMHG | WEIGHT: 151 LBS | BODY MASS INDEX: 28.51 KG/M2 | SYSTOLIC BLOOD PRESSURE: 122 MMHG | HEIGHT: 61 IN

## 2024-11-13 DIAGNOSIS — Z72.3 LACK OF PHYSICAL EXERCISE: ICD-10-CM

## 2024-11-13 DIAGNOSIS — Z01.419 ENCOUNTER FOR GYNECOLOGICAL EXAMINATION (GENERAL) (ROUTINE) W/OUT ABNORMAL FINDINGS: ICD-10-CM

## 2024-11-13 PROCEDURE — 99396 PREV VISIT EST AGE 40-64: CPT

## 2024-11-13 PROCEDURE — 99459 PELVIC EXAMINATION: CPT

## 2024-11-18 LAB
CYTOLOGY CVX/VAG DOC THIN PREP: NORMAL
HPV HIGH+LOW RISK DNA PNL CVX: NOT DETECTED

## 2025-04-05 ENCOUNTER — TRANSCRIPTION ENCOUNTER (OUTPATIENT)
Age: 55
End: 2025-04-05

## 2025-04-05 ENCOUNTER — INPATIENT (INPATIENT)
Facility: HOSPITAL | Age: 55
LOS: 2 days | Discharge: HOME | DRG: 395 | End: 2025-04-08
Attending: SURGERY | Admitting: SURGERY
Payer: COMMERCIAL

## 2025-04-05 VITALS
SYSTOLIC BLOOD PRESSURE: 153 MMHG | HEART RATE: 95 BPM | TEMPERATURE: 98 F | DIASTOLIC BLOOD PRESSURE: 94 MMHG | OXYGEN SATURATION: 99 % | RESPIRATION RATE: 18 BRPM

## 2025-04-05 DIAGNOSIS — K63.89 OTHER SPECIFIED DISEASES OF INTESTINE: ICD-10-CM

## 2025-04-05 LAB
ANION GAP SERPL CALC-SCNC: 15 MMOL/L — SIGNIFICANT CHANGE UP (ref 5–17)
APTT BLD: 30 SEC — SIGNIFICANT CHANGE UP (ref 24.5–35.6)
BLD GP AB SCN SERPL QL: NEGATIVE — SIGNIFICANT CHANGE UP
BUN SERPL-MCNC: 6 MG/DL — LOW (ref 7–23)
CALCIUM SERPL-MCNC: 8.6 MG/DL — SIGNIFICANT CHANGE UP (ref 8.4–10.5)
CHLORIDE SERPL-SCNC: 107 MMOL/L — SIGNIFICANT CHANGE UP (ref 96–108)
CO2 SERPL-SCNC: 22 MMOL/L — SIGNIFICANT CHANGE UP (ref 22–31)
CREAT SERPL-MCNC: 0.36 MG/DL — LOW (ref 0.5–1.3)
EGFR: 121 ML/MIN/1.73M2 — SIGNIFICANT CHANGE UP
EGFR: 121 ML/MIN/1.73M2 — SIGNIFICANT CHANGE UP
GLUCOSE SERPL-MCNC: 73 MG/DL — SIGNIFICANT CHANGE UP (ref 70–99)
HCG SERPL-ACNC: 2.5 MIU/ML — SIGNIFICANT CHANGE UP
HCT VFR BLD CALC: 33.9 % — LOW (ref 34.5–45)
HGB BLD-MCNC: 11.4 G/DL — LOW (ref 11.5–15.5)
INR BLD: 1.03 RATIO — SIGNIFICANT CHANGE UP (ref 0.85–1.16)
LACTATE SERPL-SCNC: 0.7 MMOL/L — SIGNIFICANT CHANGE UP (ref 0.5–2)
MAGNESIUM SERPL-MCNC: 1.9 MG/DL — SIGNIFICANT CHANGE UP (ref 1.6–2.6)
MCHC RBC-ENTMCNC: 30.1 PG — SIGNIFICANT CHANGE UP (ref 27–34)
MCHC RBC-ENTMCNC: 33.6 G/DL — SIGNIFICANT CHANGE UP (ref 32–36)
MCV RBC AUTO: 89.4 FL — SIGNIFICANT CHANGE UP (ref 80–100)
NRBC BLD AUTO-RTO: 0 /100 WBCS — SIGNIFICANT CHANGE UP (ref 0–0)
PHOSPHATE SERPL-MCNC: 3 MG/DL — SIGNIFICANT CHANGE UP (ref 2.5–4.5)
PLATELET # BLD AUTO: 380 K/UL — SIGNIFICANT CHANGE UP (ref 150–400)
POTASSIUM SERPL-MCNC: 3.2 MMOL/L — LOW (ref 3.5–5.3)
POTASSIUM SERPL-SCNC: 3.2 MMOL/L — LOW (ref 3.5–5.3)
PROTHROM AB SERPL-ACNC: 11.7 SEC — SIGNIFICANT CHANGE UP (ref 9.9–13.4)
RBC # BLD: 3.79 M/UL — LOW (ref 3.8–5.2)
RBC # FLD: 13.2 % — SIGNIFICANT CHANGE UP (ref 10.3–14.5)
RH IG SCN BLD-IMP: POSITIVE — SIGNIFICANT CHANGE UP
RH IG SCN BLD-IMP: POSITIVE — SIGNIFICANT CHANGE UP
SODIUM SERPL-SCNC: 144 MMOL/L — SIGNIFICANT CHANGE UP (ref 135–145)
WBC # BLD: 9.09 K/UL — SIGNIFICANT CHANGE UP (ref 3.8–10.5)
WBC # FLD AUTO: 9.09 K/UL — SIGNIFICANT CHANGE UP (ref 3.8–10.5)

## 2025-04-05 PROCEDURE — 74177 CT ABD & PELVIS W/CONTRAST: CPT | Mod: 26

## 2025-04-05 PROCEDURE — 74018 RADEX ABDOMEN 1 VIEW: CPT | Mod: 26

## 2025-04-05 PROCEDURE — 99223 1ST HOSP IP/OBS HIGH 75: CPT | Mod: 57

## 2025-04-05 PROCEDURE — 71045 X-RAY EXAM CHEST 1 VIEW: CPT | Mod: 26

## 2025-04-05 PROCEDURE — 44050 REDUCE BOWEL OBSTRUCTION: CPT

## 2025-04-05 RX ORDER — ACETAMINOPHEN 500 MG/5ML
1000 LIQUID (ML) ORAL ONCE
Refills: 0 | Status: COMPLETED | OUTPATIENT
Start: 2025-04-05 | End: 2025-04-05

## 2025-04-05 RX ORDER — PREGABALIN 75 MG/1
1 CAPSULE ORAL
Refills: 0 | DISCHARGE

## 2025-04-05 RX ORDER — OXYCODONE HYDROCHLORIDE 30 MG/1
5 TABLET ORAL EVERY 4 HOURS
Refills: 0 | Status: DISCONTINUED | OUTPATIENT
Start: 2025-04-05 | End: 2025-04-08

## 2025-04-05 RX ORDER — MAGNESIUM SULFATE 500 MG/ML
2 SYRINGE (ML) INJECTION ONCE
Refills: 0 | Status: COMPLETED | OUTPATIENT
Start: 2025-04-05 | End: 2025-04-05

## 2025-04-05 RX ORDER — SODIUM CHLORIDE 9 G/1000ML
1000 INJECTION, SOLUTION INTRAVENOUS
Refills: 0 | Status: DISCONTINUED | OUTPATIENT
Start: 2025-04-05 | End: 2025-04-05

## 2025-04-05 RX ORDER — OXYCODONE HYDROCHLORIDE 30 MG/1
2.5 TABLET ORAL EVERY 4 HOURS
Refills: 0 | Status: DISCONTINUED | OUTPATIENT
Start: 2025-04-05 | End: 2025-04-08

## 2025-04-05 RX ORDER — ACETAMINOPHEN 500 MG/5ML
1000 LIQUID (ML) ORAL ONCE
Refills: 0 | Status: DISCONTINUED | OUTPATIENT
Start: 2025-04-05 | End: 2025-04-05

## 2025-04-05 RX ORDER — HYDROXYCHLOROQUINE SULFATE 200 MG/1
1 TABLET, FILM COATED ORAL
Refills: 0 | DISCHARGE

## 2025-04-05 RX ORDER — HEPARIN SODIUM 1000 [USP'U]/ML
5000 INJECTION INTRAVENOUS; SUBCUTANEOUS EVERY 8 HOURS
Refills: 0 | Status: DISCONTINUED | OUTPATIENT
Start: 2025-04-05 | End: 2025-04-08

## 2025-04-05 RX ORDER — ACETAMINOPHEN 500 MG/5ML
1000 LIQUID (ML) ORAL EVERY 6 HOURS
Refills: 0 | Status: COMPLETED | OUTPATIENT
Start: 2025-04-05 | End: 2025-04-06

## 2025-04-05 RX ORDER — ACETAMINOPHEN 500 MG/5ML
1000 LIQUID (ML) ORAL EVERY 8 HOURS
Refills: 0 | Status: DISCONTINUED | OUTPATIENT
Start: 2025-04-05 | End: 2025-04-05

## 2025-04-05 RX ORDER — FENTANYL CITRATE-0.9 % NACL/PF 100MCG/2ML
25 SYRINGE (ML) INTRAVENOUS
Refills: 0 | Status: DISCONTINUED | OUTPATIENT
Start: 2025-04-05 | End: 2025-04-05

## 2025-04-05 RX ORDER — ATORVASTATIN CALCIUM 80 MG/1
1 TABLET, FILM COATED ORAL
Refills: 0 | DISCHARGE

## 2025-04-05 RX ORDER — ASPIRIN 325 MG
81 TABLET ORAL
Refills: 0 | DISCHARGE

## 2025-04-05 RX ORDER — ONDANSETRON HCL/PF 4 MG/2 ML
4 VIAL (ML) INJECTION ONCE
Refills: 0 | Status: DISCONTINUED | OUTPATIENT
Start: 2025-04-05 | End: 2025-04-05

## 2025-04-05 RX ORDER — LOSARTAN POTASSIUM 100 MG/1
1 TABLET, FILM COATED ORAL
Refills: 0 | DISCHARGE

## 2025-04-05 RX ORDER — SODIUM CHLORIDE 9 G/1000ML
1000 INJECTION, SOLUTION INTRAVENOUS
Refills: 0 | Status: DISCONTINUED | OUTPATIENT
Start: 2025-04-05 | End: 2025-04-08

## 2025-04-05 RX ORDER — ENOXAPARIN SODIUM 100 MG/ML
40 INJECTION SUBCUTANEOUS EVERY 24 HOURS
Refills: 0 | Status: DISCONTINUED | OUTPATIENT
Start: 2025-04-05 | End: 2025-04-05

## 2025-04-05 RX ADMIN — Medication 100 MILLIEQUIVALENT(S): at 06:42

## 2025-04-05 RX ADMIN — Medication 100 MILLIEQUIVALENT(S): at 10:25

## 2025-04-05 RX ADMIN — OXYCODONE HYDROCHLORIDE 2.5 MILLIGRAM(S): 30 TABLET ORAL at 20:34

## 2025-04-05 RX ADMIN — SODIUM CHLORIDE 75 MILLILITER(S): 9 INJECTION, SOLUTION INTRAVENOUS at 05:33

## 2025-04-05 RX ADMIN — Medication 400 MILLIGRAM(S): at 17:25

## 2025-04-05 RX ADMIN — Medication 400 MILLIGRAM(S): at 10:26

## 2025-04-05 RX ADMIN — ENOXAPARIN SODIUM 40 MILLIGRAM(S): 100 INJECTION SUBCUTANEOUS at 06:42

## 2025-04-05 RX ADMIN — Medication 400 MILLIGRAM(S): at 05:39

## 2025-04-05 RX ADMIN — Medication 1000 MILLIGRAM(S): at 10:41

## 2025-04-05 RX ADMIN — Medication 1000 MILLIGRAM(S): at 05:56

## 2025-04-05 RX ADMIN — Medication 1000 MILLIGRAM(S): at 17:40

## 2025-04-05 RX ADMIN — OXYCODONE HYDROCHLORIDE 2.5 MILLIGRAM(S): 30 TABLET ORAL at 20:04

## 2025-04-05 RX ADMIN — Medication 400 MILLIGRAM(S): at 23:09

## 2025-04-05 RX ADMIN — Medication 25 GRAM(S): at 06:45

## 2025-04-05 RX ADMIN — SODIUM CHLORIDE 40 MILLILITER(S): 9 INJECTION, SOLUTION INTRAVENOUS at 17:25

## 2025-04-05 NOTE — H&P ADULT - NSHPPHYSICALEXAM_GEN_ALL_CORE
Physical Exam: General: Awake, alert and oriented. No acute distress. Well developed, NGT in place, not set to suction  Neurological: The patient is awake, alert and oriented to person, place, and time with normal speech. Motor function is normal with muscle strength 5/5 bilaterally to upper and lower extremities. Sensation is intact bilaterally. Reflexes 2+ bilaterally.  Skin: Skin in warm, dry and intact without rashes or lesions. Appropriate color for ethnicity. Nailbeds pink with no cyanosis or clubbing.  Head: The head is normocephalic and atraumatic without tenderness, visible or palpable masses, depressions, or scarring.  Neck: The neck is supple without adenopathy. Trachea is midline. Thyroid gland is normal without masses. Carotid pulse 2+ bilaterally without bruit. No JVD.   Cardiac: The external chest is normal in appearance without lifts, heaves, or thrills. Heart rate and rhythm are normal.   Respiratory: The chest wall is symmetric and without deformity. No signs of trauma. Chest wall is non-tender. No signs of respiratory distress.   Abdominal: Abdomen is soft, symmetric, tender LLQ, LUQ, and infraumbilical area, well-healed midline laparotomy scare. No masses, hepatomegaly, or splenomegaly are noted.  Extremities: Upper and lower extremities are atraumatic in appearance without tenderness or deformity. No swelling or erythema. Full range of motion is noted to all joints. Muscle strength is 5/5 bilaterally. Pulses palpable. Physical Exam: General: Awake, alert and oriented. No acute distress. Well developed, NGT in place, not set to suction  Neurological: The patient is awake, alert and oriented to person, place, and time with normal speech.   Cardiac: The external chest is normal in appearance without lifts, heaves, or thrills. Heart rate and rhythm are normal.   Respiratory: The chest wall is symmetric and without deformity. 2L NC  Abdominal: Abdomen is soft, symmetric, tender LLQ, LUQ, and infraumbilical area, well-healed midline laparotomy scare. No masses, hepatomegaly, or splenomegaly are noted.  Extremities: Upper and lower extremities are atraumatic in appearance without tenderness or deformity. No swelling or erythema. Full range of motion is noted to all joints. Muscle strength is 5/5 bilaterally.

## 2025-04-05 NOTE — BRIEF OPERATIVE NOTE - OPERATION/FINDINGS
Exploratory laparotomy with lysis of adhesions, closed loop obstruction in pelvis  adhesions released, fascia closed primarily

## 2025-04-05 NOTE — H&P ADULT - ASSESSMENT
The patient is a 54-year-old female with a past medical history of Discoid Lupus, Hypertension, Asthma, CAD,  x3, Partial Hysterectomy (), chronic back pain s/p Laminectomy L4-L5 (), Umbilical Hernia Repair (2013), and Small Bowel Resection with Abdominal Wall Reconstruction, and Umbilical Hernia who has been transferred from [facility] to [facility] for surgical management of a Small Bowel Obstruction.    Plan:  - DVT ppx: Lvx  - Diet: NPO  - IVF: LR at 75 mL/hr  - Exam before Meds  - Holding all home meds in setting of SBO  - BP control PRN  - f/u CXR and AXR  - f/u CT AP w/ IV and PO Contrast  - f/u Admission Labs  - Strict I/Os  - OOBAT    To be discussed with Dr. Borrego    Red Surgery  [id number] The patient is a 54-year-old female with a past medical history of Discoid Lupus, Hypertension, Asthma, CAD,  x3, Partial Hysterectomy (), chronic back pain s/p Laminectomy L4-L5 (), Umbilical Hernia Repair (2013), and Small Bowel Resection with Abdominal Wall Reconstruction, and Umbilical Hernia who has been transferred from [facility] to [facility] for surgical management of a Small Bowel Obstruction.    Plan:  - DVT ppx: Lvx  - Diet: NPO  - IVF: LR at 75 mL/hr  - Exam before Meds  - Holding all home meds in setting of SBO  - BP control PRN  - f/u CXR and AXR  - f/u CT AP w/ IV and PO Contrast  - f/u Admission Labs  - Strict I/Os  - OOBAT    To be discussed with Dr. Borrego    Red Surgery  [id number]

## 2025-04-05 NOTE — PATIENT PROFILE ADULT - FALL HARM RISK - HARM RISK INTERVENTIONS

## 2025-04-05 NOTE — H&P ADULT - NSICDXPASTSURGICALHX_GEN_ALL_CORE_FT
PAST SURGICAL HISTORY:  Benign Breast Biopsy ' :  Left    History of  ' /  /  2004    History of resection of small bowel     Metatarsal fracture ' :  Closed Reduction Left Metatarsal Fracture    S/P colonoscopy ' .  dx: Diverticulosis    S/P hernia repair ':  Umbilical Hernia Repair with Mesh    S/P laminectomy ' :  Lumbar Laminectomy    S/P myomectomy '     S/P MICHAEL (total abdominal hysterectomy) ' . Benign    S/P tonsillectomy age 20    Scar tissue 10/2013:  Removal Scar Tissue @ site of prior Umbilica Hernia Repair

## 2025-04-05 NOTE — CHART NOTE - NSCHARTNOTEFT_GEN_A_CORE
POST-OPERATIVE NOTE    Subjective:  Patient is s/p exploratory laparotomy with lysis of adhesions for closed loop obstruction in pelvis. Recovering appropriately. Has some pain but it is controlled with medications. Denies nausea, vomiting, chest pain, SOB. - flatus, - BM    Vital Signs Last 24 Hrs  T(C): 36.9 (2025 21:45), Max: 36.9 (2025 19:45)  T(F): 98.4 (2025 21:45), Max: 98.5 (2025 20:45)  HR: 98 (2025 21:45) (80 - 98)  BP: 122/72 (2025 21:45) (109/70 - 159/94)  BP(mean): 82 (2025 18:00) (82 - 123)  RR: 18 (2025 21:45) (16 - 20)  SpO2: 98% (2025 21:45) (96% - 100%)    Parameters below as of 2025 21:45  Patient On (Oxygen Delivery Method): room air      I&O's Detail    2025 07:01  -  2025 07:00  --------------------------------------------------------  IN:    IV PiggyBack: 250 mL    Lactated Ringers: 150 mL  Total IN: 400 mL    OUT:    Oral Fluid: 0 mL    Voided (mL): 600 mL  Total OUT: 600 mL    Total NET: -200 mL      2025 07:01  -  2025 22:27  --------------------------------------------------------  IN:    IV PiggyBack: 100 mL    Lactated Ringers: 40 mL  Total IN: 140 mL    OUT:    Indwelling Catheter - Urethral (mL): 775 mL  Total OUT: 775 mL    Total NET: -635 mL        heparin   Injectable 5000    PAST MEDICAL & SURGICAL HISTORY:  Umbilical hernia  '       Postoperative wound infection  dx: 2013:   Infected Mesh ( s/p Umbilical Hernia Repair: ' 04)      Scar tissue  dx: 10/2013:  + Scar Tissue @ site of prior Umbilical Hernia Repair      Discoid lupus  dx:  '       Asthma  Mild      GERD (gastroesophageal reflux disease)      Gastric ulcer  ' .  Healed      Diverticulosis      Herniated lumbar intervertebral disc  '       Chronic back pain  Lumbar. Treated every 3 months by Neurosurgeon. Medically managed      Metatarsal fracture  ' :  Left Foot      Fibroid uterus  2004      Dry eyes      Small bowel perforation  dx:  2013      Incisional hernia      S/P hernia repair  ':  Umbilical Hernia Repair with Mesh      Scar tissue  10/2013:  Removal Scar Tissue @ site of prior Umbilica Hernia Repair      S/P colonoscopy  ' .  dx: Diverticulosis      S/P laminectomy  ' :  Lumbar Laminectomy      S/P tonsillectomy  age 20      History of   ' /  /  2004      Metatarsal fracture  ' :  Closed Reduction Left Metatarsal Fracture      S/P MICHAEL (total abdominal hysterectomy)  ' . Benign      S/P myomectomy  2000      Benign Breast Biopsy  2012:  Left      History of resection of small bowel            Physical Exam:  General: NAD, resting comfortably in bed  Pulmonary: Nonlabored breathing, no respiratory distress  Abdominal: soft, nondistended abdomen. Appropriately tender to palpation diffusely, worse around midline incision. No rebound, guarding or rigidity   Extremities: WWP      LABS:                        11.4   9.09  )-----------( 380      ( 2025 05:33 )             33.9     04-05    144  |  107  |  6[L]  ----------------------------<  73  3.2[L]   |  22  |  0.36[L]    Ca    8.6      2025 05:33  Phos  3.0     04-05  Mg     1.9     04-05      PT/INR - ( 2025 07:46 )   PT: 11.7 sec;   INR: 1.03 ratio         PTT - ( 2025 07:46 )  PTT:30.0 sec  CAPILLARY BLOOD GLUCOSE          Radiology and Additional Studies:    Assessment:  Patient is s/p exploratory laparotomy with lysis of adhesions for closed loop obstruction in pelvis. Recovering appropriately.     Plan:  - Pain control as needed  - LR @ 40   - NPO w/ sips  - DVT ppx  - OOB and ambulating as tolerated  - F/u AM labs    [facility]   [id number]

## 2025-04-05 NOTE — H&P ADULT - HISTORY OF PRESENT ILLNESS
The patient is a 54-year-old female with a past medical history of Discoid Lupus, Hypertension, Asthma, CAD,  x3, Partial Hysterectomy (), chronic back pain s/p Laminectomy L4-L5 (), Umbilical Hernia Repair (2013), and Small Bowel Resection with Abdominal Wall Reconstruction, and Umbilical Hernia repair by Dr. Borrego in 2013 who was admitted to [facility] on Thursday, 4/3 for abdominal pain. The patient reports last eating pizza at home on Wednesday,  evening and then quintin over in pain shortly afterwards, with the pain focused in her RLQ. Once the pain proved to be persistent for a couple of hours, her and her family decided to call the ambulance and they were taken to the hospital that evening. After a workup, she was admitted to the hospital on Thursday evening and an NGT was placed.     The patient's last BM was Wednesday when she had diarrhea twice, and has not passed flatus since. She denies nausea, vomiting, diarrhea, chest pain, back pain, and SOB. The patient has now been transferred to [facility] for surgical management of her SBO.

## 2025-04-06 LAB
ANION GAP SERPL CALC-SCNC: 15 MMOL/L — SIGNIFICANT CHANGE UP (ref 5–17)
ANION GAP SERPL CALC-SCNC: 17 MMOL/L — SIGNIFICANT CHANGE UP (ref 5–17)
BUN SERPL-MCNC: 11 MG/DL — SIGNIFICANT CHANGE UP (ref 7–23)
BUN SERPL-MCNC: 8 MG/DL — SIGNIFICANT CHANGE UP (ref 7–23)
CALCIUM SERPL-MCNC: 8.4 MG/DL — SIGNIFICANT CHANGE UP (ref 8.4–10.5)
CALCIUM SERPL-MCNC: 8.7 MG/DL — SIGNIFICANT CHANGE UP (ref 8.4–10.5)
CHLORIDE SERPL-SCNC: 104 MMOL/L — SIGNIFICANT CHANGE UP (ref 96–108)
CHLORIDE SERPL-SCNC: 105 MMOL/L — SIGNIFICANT CHANGE UP (ref 96–108)
CO2 SERPL-SCNC: 19 MMOL/L — LOW (ref 22–31)
CO2 SERPL-SCNC: 23 MMOL/L — SIGNIFICANT CHANGE UP (ref 22–31)
CREAT SERPL-MCNC: 0.4 MG/DL — LOW (ref 0.5–1.3)
CREAT SERPL-MCNC: 0.5 MG/DL — SIGNIFICANT CHANGE UP (ref 0.5–1.3)
EGFR: 111 ML/MIN/1.73M2 — SIGNIFICANT CHANGE UP
EGFR: 111 ML/MIN/1.73M2 — SIGNIFICANT CHANGE UP
EGFR: 118 ML/MIN/1.73M2 — SIGNIFICANT CHANGE UP
EGFR: 118 ML/MIN/1.73M2 — SIGNIFICANT CHANGE UP
GLUCOSE SERPL-MCNC: 70 MG/DL — SIGNIFICANT CHANGE UP (ref 70–99)
GLUCOSE SERPL-MCNC: 88 MG/DL — SIGNIFICANT CHANGE UP (ref 70–99)
HCT VFR BLD CALC: 32.6 % — LOW (ref 34.5–45)
HCT VFR BLD CALC: 33 % — LOW (ref 34.5–45)
HGB BLD-MCNC: 11.1 G/DL — LOW (ref 11.5–15.5)
HGB BLD-MCNC: 11.3 G/DL — LOW (ref 11.5–15.5)
MAGNESIUM SERPL-MCNC: 1.9 MG/DL — SIGNIFICANT CHANGE UP (ref 1.6–2.6)
MAGNESIUM SERPL-MCNC: 2.3 MG/DL — SIGNIFICANT CHANGE UP (ref 1.6–2.6)
MCHC RBC-ENTMCNC: 30 PG — SIGNIFICANT CHANGE UP (ref 27–34)
MCHC RBC-ENTMCNC: 31 PG — SIGNIFICANT CHANGE UP (ref 27–34)
MCHC RBC-ENTMCNC: 33.6 G/DL — SIGNIFICANT CHANGE UP (ref 32–36)
MCHC RBC-ENTMCNC: 34.7 G/DL — SIGNIFICANT CHANGE UP (ref 32–36)
MCV RBC AUTO: 89.2 FL — SIGNIFICANT CHANGE UP (ref 80–100)
MCV RBC AUTO: 89.3 FL — SIGNIFICANT CHANGE UP (ref 80–100)
NRBC BLD AUTO-RTO: 0 /100 WBCS — SIGNIFICANT CHANGE UP (ref 0–0)
NRBC BLD AUTO-RTO: 0 /100 WBCS — SIGNIFICANT CHANGE UP (ref 0–0)
PHOSPHATE SERPL-MCNC: 1.7 MG/DL — LOW (ref 2.5–4.5)
PHOSPHATE SERPL-MCNC: 4.1 MG/DL — SIGNIFICANT CHANGE UP (ref 2.5–4.5)
PLATELET # BLD AUTO: 393 K/UL — SIGNIFICANT CHANGE UP (ref 150–400)
PLATELET # BLD AUTO: 417 K/UL — HIGH (ref 150–400)
POTASSIUM SERPL-MCNC: 4 MMOL/L — SIGNIFICANT CHANGE UP (ref 3.5–5.3)
POTASSIUM SERPL-MCNC: 4.3 MMOL/L — SIGNIFICANT CHANGE UP (ref 3.5–5.3)
POTASSIUM SERPL-SCNC: 4 MMOL/L — SIGNIFICANT CHANGE UP (ref 3.5–5.3)
POTASSIUM SERPL-SCNC: 4.3 MMOL/L — SIGNIFICANT CHANGE UP (ref 3.5–5.3)
RBC # BLD: 3.65 M/UL — LOW (ref 3.8–5.2)
RBC # BLD: 3.7 M/UL — LOW (ref 3.8–5.2)
RBC # FLD: 13.2 % — SIGNIFICANT CHANGE UP (ref 10.3–14.5)
RBC # FLD: 13.2 % — SIGNIFICANT CHANGE UP (ref 10.3–14.5)
SODIUM SERPL-SCNC: 141 MMOL/L — SIGNIFICANT CHANGE UP (ref 135–145)
SODIUM SERPL-SCNC: 142 MMOL/L — SIGNIFICANT CHANGE UP (ref 135–145)
WBC # BLD: 10.45 K/UL — SIGNIFICANT CHANGE UP (ref 3.8–10.5)
WBC # BLD: 13.24 K/UL — HIGH (ref 3.8–10.5)
WBC # FLD AUTO: 10.45 K/UL — SIGNIFICANT CHANGE UP (ref 3.8–10.5)
WBC # FLD AUTO: 13.24 K/UL — HIGH (ref 3.8–10.5)

## 2025-04-06 PROCEDURE — 93010 ELECTROCARDIOGRAM REPORT: CPT

## 2025-04-06 RX ORDER — ACETAMINOPHEN 500 MG/5ML
1000 LIQUID (ML) ORAL EVERY 6 HOURS
Refills: 0 | Status: DISCONTINUED | OUTPATIENT
Start: 2025-04-06 | End: 2025-04-08

## 2025-04-06 RX ORDER — MAGNESIUM SULFATE 500 MG/ML
1 SYRINGE (ML) INJECTION ONCE
Refills: 0 | Status: COMPLETED | OUTPATIENT
Start: 2025-04-06 | End: 2025-04-06

## 2025-04-06 RX ORDER — ALBUTEROL SULFATE 2.5 MG/3ML
2 VIAL, NEBULIZER (ML) INHALATION EVERY 6 HOURS
Refills: 0 | Status: DISCONTINUED | OUTPATIENT
Start: 2025-04-06 | End: 2025-04-08

## 2025-04-06 RX ORDER — SODIUM CHLORIDE 9 G/1000ML
500 INJECTION, SOLUTION INTRAVENOUS ONCE
Refills: 0 | Status: COMPLETED | OUTPATIENT
Start: 2025-04-06 | End: 2025-04-06

## 2025-04-06 RX ADMIN — Medication 400 MILLIGRAM(S): at 11:06

## 2025-04-06 RX ADMIN — OXYCODONE HYDROCHLORIDE 5 MILLIGRAM(S): 30 TABLET ORAL at 15:12

## 2025-04-06 RX ADMIN — HEPARIN SODIUM 5000 UNIT(S): 1000 INJECTION INTRAVENOUS; SUBCUTANEOUS at 09:44

## 2025-04-06 RX ADMIN — Medication 40 MILLIGRAM(S): at 22:40

## 2025-04-06 RX ADMIN — OXYCODONE HYDROCHLORIDE 5 MILLIGRAM(S): 30 TABLET ORAL at 02:02

## 2025-04-06 RX ADMIN — HEPARIN SODIUM 5000 UNIT(S): 1000 INJECTION INTRAVENOUS; SUBCUTANEOUS at 01:47

## 2025-04-06 RX ADMIN — Medication 400 MILLIGRAM(S): at 05:13

## 2025-04-06 RX ADMIN — Medication 1000 MILLIGRAM(S): at 04:28

## 2025-04-06 RX ADMIN — HEPARIN SODIUM 5000 UNIT(S): 1000 INJECTION INTRAVENOUS; SUBCUTANEOUS at 17:33

## 2025-04-06 RX ADMIN — Medication 1000 MILLIGRAM(S): at 17:33

## 2025-04-06 RX ADMIN — Medication 1000 MILLIGRAM(S): at 11:30

## 2025-04-06 RX ADMIN — OXYCODONE HYDROCHLORIDE 5 MILLIGRAM(S): 30 TABLET ORAL at 02:32

## 2025-04-06 RX ADMIN — SODIUM CHLORIDE 1000 MILLILITER(S): 9 INJECTION, SOLUTION INTRAVENOUS at 22:28

## 2025-04-06 RX ADMIN — OXYCODONE HYDROCHLORIDE 5 MILLIGRAM(S): 30 TABLET ORAL at 15:58

## 2025-04-06 RX ADMIN — Medication 1000 MILLIGRAM(S): at 18:22

## 2025-04-06 NOTE — CHART NOTE - NSCHARTNOTEFT_GEN_A_CORE
Pt seen at bedside for tachycardia (107 HR, 165/97) on routine vital check. Patient resting comfortably with family at bedside. States she can hear her own heartbeat after ambulating the halls earlier this evening. Patient given tylenol by floor staff prior to seeing her and stated she felt better afterwards.    Abd exam, soft, mild TTP LLQ, dressings c/d/i.    Plan:  - EKG - sinus tachycardia 105  - Giles discontinued earlier today and urine output not accurate due to an unsaved void, but overall seems to making appropriate urine. Due to inconsistent UOP, will order 500 cc LR bolus  - Stat CBC, BMP - Hgb lateral, WBC downtrending. Repleting electrolytes    Discussed with night surgery team    Lowell Alfaro MD PGY-1  [facility]  #[id number] Pt seen at bedside for tachycardia (107 HR, 165/97) on routine vital check. Patient resting comfortably with family at bedside. States she can hear her own heartbeat after ambulating the halls earlier this evening. Patient given tylenol by floor staff prior to seeing her and stated she felt better afterwards.    Abd exam, soft, mild TTP LLQ, dressings c/d/i.    Plan:  - EKG - sinus tachycardia 105  - Giles discontinued earlier today and urine output not accurate due to an unsaved void, but overall seems to making appropriate urine. Due to inconsistent UOP, will order 500 cc LR bolus  - Stat CBC, BMP - Hgb lateral, WBC downtrending. Repleting electrolytes.      Discussed with night surgery team    Lowell Alfaro MD PGY-1  [facility]  #[id number]    Given mild improvement in tachycardia (101 HR, 159/95) and patient feeling better than first seen this evening and making appropriate UOP, will hold off on additional boluses, but will increase IVF to 60.

## 2025-04-06 NOTE — PROGRESS NOTE ADULT - ASSESSMENT
Patient is s/p exploratory laparotomy with lysis of adhesions for closed loop obstruction in pelvis. Recovering appropriately.     Plan:  - Pain control as needed  - LR @ 40   - clears  - DVT ppx  - OOB and ambulating as tolerated  - restart home inhalers

## 2025-04-06 NOTE — PROGRESS NOTE ADULT - SUBJECTIVE AND OBJECTIVE BOX
Subjective:  Patient seen at bedside this AM. Reports feeling well, minimal pain, without N/V. complains of some SOB related to her asthma, requesting her inhaler which she takes daily      24h Events:   - Overnight, no acute events    Objective:    Physical Exam:  GEN: resting in bed comfortably in NAD  RESP: no increased WOB  ABD: soft, non-distended, non-tender to palpation without rebound tenderness or guarding  EXTR: warm, well-perfused without gross deformities; spontaneous movement in b/l U/L extrem  NEURO: awake, alert    Vital Signs  T(C): 37.1 (04-06 @ 04:50), Max: 37.1 (04-06 @ 04:50)  HR: 94 (04-06 @ 04:50) (80 - 98)  BP: 122/77 (04-06 @ 04:50) (109/70 - 159/94)  RR: 18 (04-06 @ 04:50) (16 - 20)  SpO2: 97% (04-06 @ 04:50) (96% - 100%)  04-05-25 @ 07:01  -  04-06-25 @ 07:00  --------------------------------------------------------  IN:  Total IN: 0 mL    OUT:    Indwelling Catheter - Urethral (mL): 1175 mL  Total OUT: 1175 mL    Total NET: -1175 mL          Labs:                        11.3   13.24 )-----------( 393      ( 06 Apr 2025 07:12 )             32.6   04-06    141  |  105  |  8   ----------------------------<  70  4.3   |  19[L]  |  0.40[L]    Ca    8.4      06 Apr 2025 07:09  Phos  4.1     04-06  Mg     2.3     04-06      CAPILLARY BLOOD GLUCOSE          Medications:  MEDICATIONS  (STANDING):  acetaminophen   IVPB .. 1000 milliGRAM(s) IV Intermittent every 6 hours  fluticasone propionate/ salmeterol 100-50 MICROgram(s) Diskus 1 Dose(s) Inhalation two times a day  heparin   Injectable 5000 Unit(s) SubCutaneous every 8 hours  lactated ringers. 1000 milliLiter(s) (40 mL/Hr) IV Continuous <Continuous>    MEDICATIONS  (PRN):  albuterol    90 MICROgram(s) HFA Inhaler 2 Puff(s) Inhalation every 6 hours PRN Bronchospasm  oxyCODONE    IR 2.5 milliGRAM(s) Oral every 4 hours PRN Moderate Pain (4 - 6)  oxyCODONE    IR 5 milliGRAM(s) Oral every 4 hours PRN Severe Pain (7 - 10)      Imaging:

## 2025-04-07 LAB
ANION GAP SERPL CALC-SCNC: 13 MMOL/L — SIGNIFICANT CHANGE UP (ref 5–17)
BUN SERPL-MCNC: 8 MG/DL — SIGNIFICANT CHANGE UP (ref 7–23)
CALCIUM SERPL-MCNC: 8.2 MG/DL — LOW (ref 8.4–10.5)
CHLORIDE SERPL-SCNC: 103 MMOL/L — SIGNIFICANT CHANGE UP (ref 96–108)
CO2 SERPL-SCNC: 24 MMOL/L — SIGNIFICANT CHANGE UP (ref 22–31)
CREAT SERPL-MCNC: 0.39 MG/DL — LOW (ref 0.5–1.3)
EGFR: 118 ML/MIN/1.73M2 — SIGNIFICANT CHANGE UP
EGFR: 118 ML/MIN/1.73M2 — SIGNIFICANT CHANGE UP
GLUCOSE SERPL-MCNC: 92 MG/DL — SIGNIFICANT CHANGE UP (ref 70–99)
HCT VFR BLD CALC: 30.2 % — LOW (ref 34.5–45)
HGB BLD-MCNC: 10.4 G/DL — LOW (ref 11.5–15.5)
MAGNESIUM SERPL-MCNC: 2.1 MG/DL — SIGNIFICANT CHANGE UP (ref 1.6–2.6)
MCHC RBC-ENTMCNC: 30.7 PG — SIGNIFICANT CHANGE UP (ref 27–34)
MCHC RBC-ENTMCNC: 34.4 G/DL — SIGNIFICANT CHANGE UP (ref 32–36)
MCV RBC AUTO: 89.1 FL — SIGNIFICANT CHANGE UP (ref 80–100)
NRBC BLD AUTO-RTO: 0 /100 WBCS — SIGNIFICANT CHANGE UP (ref 0–0)
PHOSPHATE SERPL-MCNC: 3.9 MG/DL — SIGNIFICANT CHANGE UP (ref 2.5–4.5)
PLATELET # BLD AUTO: 383 K/UL — SIGNIFICANT CHANGE UP (ref 150–400)
POTASSIUM SERPL-MCNC: 3.5 MMOL/L — SIGNIFICANT CHANGE UP (ref 3.5–5.3)
POTASSIUM SERPL-SCNC: 3.5 MMOL/L — SIGNIFICANT CHANGE UP (ref 3.5–5.3)
RBC # BLD: 3.39 M/UL — LOW (ref 3.8–5.2)
RBC # FLD: 13.6 % — SIGNIFICANT CHANGE UP (ref 10.3–14.5)
SODIUM SERPL-SCNC: 140 MMOL/L — SIGNIFICANT CHANGE UP (ref 135–145)
WBC # BLD: 9.3 K/UL — SIGNIFICANT CHANGE UP (ref 3.8–10.5)
WBC # FLD AUTO: 9.3 K/UL — SIGNIFICANT CHANGE UP (ref 3.8–10.5)

## 2025-04-07 RX ORDER — OXYCODONE HYDROCHLORIDE 30 MG/1
1 TABLET ORAL
Refills: 0
Start: 2025-04-07

## 2025-04-07 RX ORDER — OXYCODONE HYDROCHLORIDE 30 MG/1
1 TABLET ORAL
Qty: 5 | Refills: 0
Start: 2025-04-07

## 2025-04-07 RX ORDER — TRAMADOL HYDROCHLORIDE 50 MG/1
1 TABLET, FILM COATED ORAL
Refills: 0
Start: 2025-04-07

## 2025-04-07 RX ORDER — POTASSIUM PHOSPHATE, MONOBASIC POTASSIUM PHOSPHATE, DIBASIC INJECTION, 236; 224 MG/ML; MG/ML
30 SOLUTION, CONCENTRATE INTRAVENOUS ONCE
Refills: 0 | Status: COMPLETED | OUTPATIENT
Start: 2025-04-07 | End: 2025-04-07

## 2025-04-07 RX ORDER — ONDANSETRON HCL/PF 4 MG/2 ML
4 VIAL (ML) INJECTION EVERY 8 HOURS
Refills: 0 | Status: DISCONTINUED | OUTPATIENT
Start: 2025-04-07 | End: 2025-04-08

## 2025-04-07 RX ADMIN — Medication 40 MILLIGRAM(S): at 23:43

## 2025-04-07 RX ADMIN — POTASSIUM PHOSPHATE, MONOBASIC POTASSIUM PHOSPHATE, DIBASIC INJECTION, 83.33 MILLIMOLE(S): 236; 224 SOLUTION, CONCENTRATE INTRAVENOUS at 07:25

## 2025-04-07 RX ADMIN — HEPARIN SODIUM 5000 UNIT(S): 1000 INJECTION INTRAVENOUS; SUBCUTANEOUS at 01:01

## 2025-04-07 RX ADMIN — OXYCODONE HYDROCHLORIDE 2.5 MILLIGRAM(S): 30 TABLET ORAL at 23:43

## 2025-04-07 RX ADMIN — HEPARIN SODIUM 5000 UNIT(S): 1000 INJECTION INTRAVENOUS; SUBCUTANEOUS at 09:30

## 2025-04-07 RX ADMIN — OXYCODONE HYDROCHLORIDE 5 MILLIGRAM(S): 30 TABLET ORAL at 14:53

## 2025-04-07 RX ADMIN — Medication 1000 MILLIGRAM(S): at 05:16

## 2025-04-07 RX ADMIN — Medication 1 DOSE(S): at 17:07

## 2025-04-07 RX ADMIN — Medication 40 MILLIEQUIVALENT(S): at 10:40

## 2025-04-07 RX ADMIN — Medication 1000 MILLIGRAM(S): at 11:18

## 2025-04-07 RX ADMIN — Medication 1000 MILLIGRAM(S): at 17:07

## 2025-04-07 RX ADMIN — HEPARIN SODIUM 5000 UNIT(S): 1000 INJECTION INTRAVENOUS; SUBCUTANEOUS at 17:08

## 2025-04-07 RX ADMIN — Medication 2 PUFF(S): at 11:18

## 2025-04-07 RX ADMIN — Medication 100 GRAM(S): at 01:01

## 2025-04-07 RX ADMIN — Medication 85 MILLIMOLE(S): at 01:01

## 2025-04-07 RX ADMIN — OXYCODONE HYDROCHLORIDE 5 MILLIGRAM(S): 30 TABLET ORAL at 15:30

## 2025-04-07 RX ADMIN — Medication 4 MILLIGRAM(S): at 20:49

## 2025-04-07 RX ADMIN — Medication 1000 MILLIGRAM(S): at 11:55

## 2025-04-07 RX ADMIN — Medication 1000 MILLIGRAM(S): at 17:34

## 2025-04-07 RX ADMIN — Medication 40 MILLIGRAM(S): at 11:18

## 2025-04-07 NOTE — PROGRESS NOTE ADULT - ASSESSMENT
54F with a pmhx of Discoid Lupus, HTN, Asthma, CAD,  x3, Partial Hysterectomy (), chronic back pain s/p Laminectomy L4-L5 (), Umbilical Hernia Repair (2013), and Small Bowel Resection with Abdominal Wall Reconstruction, and Umbilical Hernia who has been transferred from [facility] to [facility] for surgical management of a SBO. 4/5 s/p exploratory laparotomy with lysis of adhesions for closed loop obstruction in pelvis. Recovering appropriately.     Plan:  - Pain control as needed  - LR @ 40   - Diet- c/w clears  - DVT ppx  - OOB and ambulating as tolerated  - restarted home inhalers       [facility]  [phone] (pager)

## 2025-04-07 NOTE — DISCHARGE NOTE PROVIDER - HOSPITAL COURSE
HPI: The patient is a 54-year-old female with a past medical history of Discoid Lupus, Hypertension, Asthma, CAD,  x3, Partial Hysterectomy (), chronic back pain s/p Laminectomy L4-L5 (), Umbilical Hernia Repair (2013), and Small Bowel Resection with Abdominal Wall Reconstruction, and Umbilical Hernia repair by Dr. Borrego in 2013 who was admitted to [facility] on Thursday, 4/3 for abdominal pain. The patient reports last eating pizza at home on Wednesday,  evening and then quintin over in pain shortly afterwards, with the pain focused in her RLQ. Once the pain proved to be persistent for a couple of hours, her and her family decided to call the ambulance and they were taken to the hospital that evening. After a workup, she was admitted to the hospital on Thursday evening and an NGT was placed.     The patient's last BM was Wednesday when she had diarrhea twice, and has not passed flatus since. She denies nausea, vomiting, diarrhea, chest pain, back pain, and SOB. The patient has now been transferred to [facility] for surgical management of her SBO. (2025 04:47)    Pt was admitted under Colorectal Surgery for further evaluation and management. Pt was taken to the OR on 25, and is s/p exploratory laparotomy with lysis of adhesions for closed loop obstruction in pelvis. The patient tolerated the procedure well (see operative report for full details). Pt was transferred to the PACU in stable condition. In the PACU, the patient's pain was controlled and vitals stable. On POC, the patient was doing well. The patient was transferred to the surgical floor in stable condition.    On POD #1, pt was stable and doing well. Once IV pain control dosing complete, pt was transitioned to oral Tylenol and with Oxycodone for breakthrough pain. Diet was advanced as tolerated, and GI function returned. Labs were monitored daily, and electrolytes were repleted as necessary.    On the day of discharge, the patient's vitals are stable, pain is controlled, voiding urine, passing gas/stool, tolerating a low fiber diet, and ambulating well. Pt will f/u with Dr. Scherer in 1-2 weeks. Pt will f/u with PCP in 1-2 weeks.

## 2025-04-07 NOTE — PROGRESS NOTE ADULT - ASSESSMENT
Bette Ta is a 54 y.o. F with PMH of Discoid Lupus, HTN, Asthma, CAD,  x3, Partial Hysterectomy (), chronic back pain s/p Laminectomy L4-L5 (), Umbilical Hernia Repair (2013), and Small Bowel Resection with Abdominal Wall Reconstruction who is now s/p exploratory laparotomy with lysis of adhesions for closed loop obstruction in pelvis on . Recovering appropriately.    Plan:   - repleting electrolytes: potassium phosphate IVPB 30mm over 6 hrs  - Pain control as needed- acetaminophen 1000mg PO q6H, PRN oxycodone 2.5mg PO for moderate pain, 5mg PO for severe pain  - LR @ 60  - Diet: clears  - OOB and ambulating as tolerated  - home inhalers restarted  - DVT ppx: Heparin SC 5000 U q8H Bette Ta is a 54 y.o. F with PMH of Discoid Lupus, HTN, Asthma, CAD,  x3, Partial Hysterectomy (), chronic back pain s/p Laminectomy L4-L5 (), Umbilical Hernia Repair (2013), and Small Bowel Resection with Abdominal Wall Reconstruction who is now s/p exploratory laparotomy with lysis of adhesions for closed loop obstruction in pelvis on . Recovering appropriately.    Plan:   - repleting electrolytes: potassium phosphate IVPB 30mm over 6 hrs  - Pain control as needed- acetaminophen 1000mg PO q6H, PRN oxycodone 2.5mg PO for moderate pain, 5mg PO for severe pain  - LR @ 60  - Diet: clears  - OOB and ambulating as tolerated  - Home inhalers restarted  - DVT ppx: Heparin SC 5000 U q8H Bette Ta is a 54 y.o. F with PMH of Discoid Lupus, HTN, Asthma, CAD,  x3, Partial Hysterectomy (), chronic back pain s/p Laminectomy L4-L5 (), Umbilical Hernia Repair (2013), and Small Bowel Resection with Abdominal Wall Reconstruction who is now s/p exploratory laparotomy with lysis of adhesions for closed loop obstruction in pelvis on . Recovering appropriately. -/-    Plan:   - repleting electrolytes: potassium phosphate IVPB 30mm over 6 hrs  - Pain control as needed- acetaminophen 1000mg PO q6H, PRN oxycodone 2.5mg PO for moderate pain, 5mg PO for severe pain  - LR @ 60  - Diet: LRD  - OOB and ambulating as tolerated  - Home inhalers restarted  - DVT ppx: SQH Bette Ta is a 54 y.o. F with PMH of Discoid Lupus, HTN, Asthma, CAD,  x3, Partial Hysterectomy (), chronic back pain s/p Laminectomy L4-L5 (), Umbilical Hernia Repair (2013), and Small Bowel Resection with Abdominal Wall Reconstruction who is now s/p exploratory laparotomy with lysis of adhesions for closed loop obstruction in pelvis on . Recovering appropriately. -/-    Plan:   - repleting electrolytes: potassium phosphate IVPB 30mm over 6 hrs  - Pain control as needed- acetaminophen 1000mg PO q6H, PRN oxycodone 2.5mg PO for moderate pain, 5mg PO for severe pain  - LR @ 60  - Diet: clears, can advance to LRD for lunch if tolerated  - OOB and ambulating as tolerated  - Home inhalers restarted  - DVT ppx: SQH

## 2025-04-07 NOTE — DISCHARGE NOTE PROVIDER - CARE PROVIDER_API CALL
Ramos Scherer  Colon/Rectal Surgery  [address], [address]  [place] [address]  Phone: [phone]  Fax: [phone]  Follow Up Time: 2 weeks

## 2025-04-07 NOTE — DISCHARGE NOTE PROVIDER - NSDCMRMEDTOKEN_GEN_ALL_CORE_FT
albuterol CFC free 90 mcg/inh inhalation aerosol: 1 puff(s) inhaled every 6 hours, As needed, mild asthma  aspirin 81 mg oral tablet: 81 milligram(s) orally once a day  hydroxychloroquine 100 mg oral tablet: 1 tab(s) orally once a day  Lipitor 40 mg oral tablet: 1 tab(s) orally once a day  losartan 25 mg oral tablet: 1 tab(s) orally once a day  multivitamin: 1  orally once a day  ocular lubricant ophthalmic solution: 1 drop(s) to each affected eye once a day  pantoprazole 40 mg oral delayed release tablet: 1 tab(s) orally once a day (before a meal)  pregabalin 100 mg oral capsule: 1 cap(s) orally once a day  Zepbound 10 mg/0.5 mL subcutaneous solution: 10 milligram(s) subcutaneously once a week   acetaminophen 500 mg oral tablet: 2 tab(s) orally every 6 hours  albuterol CFC free 90 mcg/inh inhalation aerosol: 1 puff(s) inhaled every 6 hours, As needed, mild asthma  aspirin 81 mg oral tablet: 81 milligram(s) orally once a day  hydroxychloroquine 100 mg oral tablet: 1 tab(s) orally once a day  Lipitor 40 mg oral tablet: 1 tab(s) orally once a day  losartan 25 mg oral tablet: 1 tab(s) orally once a day  multivitamin: 1  orally once a day  ocular lubricant ophthalmic solution: 1 drop(s) to each affected eye once a day  oxyCODONE 5 mg oral tablet: 1 tab(s) orally every 6 hours as needed for  severe pain MDD: 4 tabs  pantoprazole 40 mg oral delayed release tablet: 1 tab(s) orally once a day (before a meal)  pregabalin 100 mg oral capsule: 1 cap(s) orally once a day  Zepbound 10 mg/0.5 mL subcutaneous solution: 10 milligram(s) subcutaneously once a week

## 2025-04-07 NOTE — DISCHARGE NOTE PROVIDER - NSDCCPTREATMENT_GEN_ALL_CORE_FT
PRINCIPAL PROCEDURE  Procedure: Lysis, adhesions, for small bowel obstruction  Findings and Treatment:

## 2025-04-07 NOTE — DISCHARGE NOTE PROVIDER - NSDCCPCAREPLAN_GEN_ALL_CORE_FT
PRINCIPAL DISCHARGE DIAGNOSIS  Diagnosis: Large bowel obstruction  Assessment and Plan of Treatment: WOUND CARE: Remove outer dressing prior to shower.  You may shower. Do not scrub incision. Pat Dry abdomen. Staples will be removed at follow up office visit. Cover incisions with dry gauze and paper tape, change daily or as needed.  BATHING: You may shower and/or sponge bathe 24 hours after surgery. Do not submerge the incision underwater for the next 2 weeks.  ACTIVITY: No heavy lifting anything more than 10-15lbs or straining. Otherwise, you may return to your usual level of physical activity. If you are taking narcotic pain medication (such as Oxycodone) do NOT drive a car, operate machinery or make important decisions.  DIET: Maintain Low Fiber Diet until your next appointment.  PAIN: A prescription for oxycodone has been sent to the pharmacy. You should only take these for severe pain. For mild or moderate pain, you may take 975mg of tylenol every 6 hours. Do not exceed more than 4G tylenol per day.   NOTIFY YOUR SURGEON IF: You have any bleeding that does not stop, any pus draining from your wound, any fever (over 100.4 F) or chills, persistent nausea/vomiting with inability to tolerate food or liquids, persistent diarrhea, or if your pain is not controlled on your discharge pain medications.  FOLLOW-UP:  1. Please call to make a follow-up appointment within one to two weeks of discharge with Dr. Scherer.  2. Please follow up with your primary care physician in one week regarding your hospitalization.

## 2025-04-07 NOTE — PROGRESS NOTE ADULT - SUBJECTIVE AND OBJECTIVE BOX
STATUS POST:  Exploratory laparotomy with lysis of adhesions, closed loop obstruction in pelvis    Vital Signs Last 24 Hrs  T(C): 36.7 (07 Apr 2025 05:13), Max: 37.6 (06 Apr 2025 21:17)  T(F): 98.1 (07 Apr 2025 05:13), Max: 99.6 (06 Apr 2025 21:17)  HR: 97 (07 Apr 2025 05:13) (92 - 107)  BP: 158/84 (07 Apr 2025 05:13) (152/85 - 165/97)  BP(mean): --  RR: 18 (07 Apr 2025 05:13) (18 - 18)  SpO2: 98% (07 Apr 2025 05:13) (97% - 100%)    Parameters below as of 07 Apr 2025 05:13  Patient On (Oxygen Delivery Method): room air    OVERNIGHT: Pt tachycardic (, 165/97) on routine vital check. EKG showed sinus tachycardia 105. Tylenol and 1x 500cc LR bolus administered with mild improvement in tachycardia (, 159/95), LR rate increased from 40 to 60.     SUBJECTIVE: Pt seen and examined at bedside.     Pain: [ ] YES [ ] NO  Pain (0-10):              Pain Control Adequate: [ ] YES [ ] NO  SOB: [ ]YES [ ] NO  Chest Discomfort: [ ] YES [ ] NO    Nausea: [ ] YES [ ] NO           Vomiting: [ ] YES [ ] NO  Flatus: [ ] YES [ ] NO             Bowel Movement: [ ] YES [ ] NO     Void: [ ]YES [ ]No    General Appearance: Appears well, NAD  Neck: Supple  Chest: Equal expansion bilaterally, equal breath sounds  CV: Pulse regular presently  Abdomen: Soft, nontense, appropriate incisional tenderness, dressings clean and dry and intact  Extremities: Grossly symmetric, SCD's in place     I&O's Summary    05 Apr 2025 07:01  -  06 Apr 2025 07:00  --------------------------------------------------------  IN: 620 mL / OUT: 1175 mL / NET: -555 mL    06 Apr 2025 07:01  -  07 Apr 2025 05:28  --------------------------------------------------------  IN: 2000 mL / OUT: 400 mL / NET: 1600 mL      I&O's Detail    05 Apr 2025 07:01  -  06 Apr 2025 07:00  --------------------------------------------------------  IN:    IV PiggyBack: 100 mL    Lactated Ringers: 520 mL  Total IN: 620 mL    OUT:    Indwelling Catheter - Urethral (mL): 1175 mL  Total OUT: 1175 mL    Total NET: -555 mL      06 Apr 2025 07:01  -  07 Apr 2025 05:28  --------------------------------------------------------  IN:    IV PiggyBack: 1100 mL    Lactated Ringers: 720 mL    Oral Fluid: 180 mL  Total IN: 2000 mL    OUT:    Voided (mL): 400 mL  Total OUT: 400 mL    Total NET: 1600 mL          MEDICATIONS  (STANDING):  acetaminophen     Tablet .. 1000 milliGRAM(s) Oral every 6 hours  fluticasone propionate/ salmeterol 100-50 MICROgram(s) Diskus 1 Dose(s) Inhalation two times a day  heparin   Injectable 5000 Unit(s) SubCutaneous every 8 hours  lactated ringers. 1000 milliLiter(s) (60 mL/Hr) IV Continuous <Continuous>  pantoprazole  Injectable 40 milliGRAM(s) IV Push daily  potassium phosphate IVPB 30 milliMole(s) IV Intermittent once    MEDICATIONS  (PRN):  albuterol    90 MICROgram(s) HFA Inhaler 2 Puff(s) Inhalation every 6 hours PRN Bronchospasm  oxyCODONE    IR 2.5 milliGRAM(s) Oral every 4 hours PRN Moderate Pain (4 - 6)  oxyCODONE    IR 5 milliGRAM(s) Oral every 4 hours PRN Severe Pain (7 - 10)      LABS:                        11.1   10.45 )-----------( 417      ( 06 Apr 2025 22:56 )             33.0     04-06    142  |  104  |  11  ----------------------------<  88  4.0   |  23  |  0.50    Ca    8.7      06 Apr 2025 22:56  Phos  1.7     04-06  Mg     1.9     04-06      PT/INR - ( 05 Apr 2025 07:46 )   PT: 11.7 sec;   INR: 1.03 ratio         PTT - ( 05 Apr 2025 07:46 )  PTT:30.0 sec  Urinalysis Basic - ( 06 Apr 2025 22:56 )    Color: x / Appearance: x / SG: x / pH: x  Gluc: 88 mg/dL / Ketone: x  / Bili: x / Urobili: x   Blood: x / Protein: x / Nitrite: x   Leuk Esterase: x / RBC: x / WBC x   Sq Epi: x / Non Sq Epi: x / Bacteria: x        RADIOLOGY & ADDITIONAL STUDIES: STATUS POST:  Exploratory laparotomy with lysis of adhesions for closed loop obstruction in pelvis    Vital Signs Last 24 Hrs  T(C): 36.7 (07 Apr 2025 05:13), Max: 37.6 (06 Apr 2025 21:17)  T(F): 98.1 (07 Apr 2025 05:13), Max: 99.6 (06 Apr 2025 21:17)  HR: 97 (07 Apr 2025 05:13) (92 - 107)  BP: 158/84 (07 Apr 2025 05:13) (152/85 - 165/97)  BP(mean): --  RR: 18 (07 Apr 2025 05:13) (18 - 18)  SpO2: 98% (07 Apr 2025 05:13) (97% - 100%)    Parameters below as of 07 Apr 2025 05:13  Patient On (Oxygen Delivery Method): room air    OVERNIGHT: Pt tachycardic (, 165/97) on routine vital check. EKG showed sinus tachycardia 105. Tylenol and 1x 500cc LR bolus administered with mild improvement in tachycardia (, 159/95), LR rate increased from 40 to 60.     SUBJECTIVE: Pt seen and examined at bedside. Pt states she slept okay, she is currently having no pain and feels pain has been well controlled with current regimen. Has not passed flatus or had a BM yet. Tolerating clears, no N/V. Ambulated 4x around the unit throughout the day yesterday, encouraged to continue ambulating. Denies SOB, CP.     Pain: [ ] YES [X ] NO  Pain (0-10): 0             Pain Control Adequate: [X ] YES [ ] NO  SOB: [ ]YES [X ] NO  Chest Discomfort: [ ] YES [ X] NO    Nausea: [ ] YES [ X] NO           Vomiting: [ ] YES [X ] NO  Flatus: [ ] YES [X ] NO             Bowel Movement: [ ] YES [X ] NO     Void: [X ]YES [ ]No    General Appearance: Appears well, NAD  Neck: Supple  Chest: Equal expansion bilaterally, equal breath sounds  CV: Pulse regular presently  Abdomen: Soft, nontense, appropriate incisional tenderness, dressings clean and dry and intact  Extremities: Grossly symmetric    I&O's Summary    05 Apr 2025 07:01  -  06 Apr 2025 07:00  --------------------------------------------------------  IN: 620 mL / OUT: 1175 mL / NET: -555 mL    06 Apr 2025 07:01  -  07 Apr 2025 05:28  --------------------------------------------------------  IN: 2000 mL / OUT: 400 mL / NET: 1600 mL      I&O's Detail    05 Apr 2025 07:01  -  06 Apr 2025 07:00  --------------------------------------------------------  IN:    IV PiggyBack: 100 mL    Lactated Ringers: 520 mL  Total IN: 620 mL    OUT:    Indwelling Catheter - Urethral (mL): 1175 mL  Total OUT: 1175 mL    Total NET: -555 mL      06 Apr 2025 07:01  -  07 Apr 2025 05:28  --------------------------------------------------------  IN:    IV PiggyBack: 1100 mL    Lactated Ringers: 720 mL    Oral Fluid: 180 mL  Total IN: 2000 mL    OUT:    Voided (mL): 400 mL  Total OUT: 400 mL    Total NET: 1600 mL          MEDICATIONS  (STANDING):  acetaminophen     Tablet .. 1000 milliGRAM(s) Oral every 6 hours  fluticasone propionate/ salmeterol 100-50 MICROgram(s) Diskus 1 Dose(s) Inhalation two times a day  heparin   Injectable 5000 Unit(s) SubCutaneous every 8 hours  lactated ringers. 1000 milliLiter(s) (60 mL/Hr) IV Continuous <Continuous>  pantoprazole  Injectable 40 milliGRAM(s) IV Push daily  potassium phosphate IVPB 30 milliMole(s) IV Intermittent once    MEDICATIONS  (PRN):  albuterol    90 MICROgram(s) HFA Inhaler 2 Puff(s) Inhalation every 6 hours PRN Bronchospasm  oxyCODONE    IR 2.5 milliGRAM(s) Oral every 4 hours PRN Moderate Pain (4 - 6)  oxyCODONE    IR 5 milliGRAM(s) Oral every 4 hours PRN Severe Pain (7 - 10)      LABS:                        11.1   10.45 )-----------( 417      ( 06 Apr 2025 22:56 )             33.0     04-06    142  |  104  |  11  ----------------------------<  88  4.0   |  23  |  0.50    Ca    8.7      06 Apr 2025 22:56  Phos  1.7     04-06  Mg     1.9     04-06      PT/INR - ( 05 Apr 2025 07:46 )   PT: 11.7 sec;   INR: 1.03 ratio         PTT - ( 05 Apr 2025 07:46 )  PTT:30.0 sec  Urinalysis Basic - ( 06 Apr 2025 22:56 )    Color: x / Appearance: x / SG: x / pH: x  Gluc: 88 mg/dL / Ketone: x  / Bili: x / Urobili: x   Blood: x / Protein: x / Nitrite: x   Leuk Esterase: x / RBC: x / WBC x   Sq Epi: x / Non Sq Epi: x / Bacteria: x        RADIOLOGY & ADDITIONAL STUDIES: STATUS POST:  Exploratory laparotomy with lysis of adhesions for closed loop obstruction in pelvis    Vital Signs Last 24 Hrs  T(C): 36.7 (07 Apr 2025 05:13), Max: 37.6 (06 Apr 2025 21:17)  T(F): 98.1 (07 Apr 2025 05:13), Max: 99.6 (06 Apr 2025 21:17)  HR: 97 (07 Apr 2025 05:13) (92 - 107)  BP: 158/84 (07 Apr 2025 05:13) (152/85 - 165/97)  BP(mean): --  RR: 18 (07 Apr 2025 05:13) (18 - 18)  SpO2: 98% (07 Apr 2025 05:13) (97% - 100%)    Parameters below as of 07 Apr 2025 05:13  Patient On (Oxygen Delivery Method): room air    OVERNIGHT: Pt tachycardic (, 165/97) on routine vital check. EKG showed sinus tachycardia 105. Tylenol and 1x 500cc LR bolus administered with mild improvement in tachycardia (, 159/95), LR rate increased from 40 to 60.     SUBJECTIVE: Pt seen and examined at bedside. Pt states she slept okay, she is currently having no pain and feels pain has been well controlled with current regimen. Has not passed flatus or had a BM yet. Tolerating clears, no N/V. Ambulated 4x around the unit throughout the day yesterday, encouraged to continue ambulating. Denies SOB, CP.     Pain: [ ] YES [X ] NO  Pain (0-10): 0             Pain Control Adequate: [X ] YES [ ] NO  SOB: [ ]YES [X ] NO  Chest Discomfort: [ ] YES [ X] NO    Nausea: [ ] YES [ X] NO           Vomiting: [ ] YES [X ] NO  Flatus: [ ] YES [X ] NO             Bowel Movement: [ ] YES [X ] NO     Void: [X ]YES [ ]No    General Appearance: Appears well, NAD  Neck: Supple  Chest: Equal expansion bilaterally, equal breath sounds  CV: Pulse regular presently  Abdomen: Soft, nontense, appropriate tenderness to palpation mainly in L quadrants and epigastric regions, dressings clean and dry and intact  Extremities: Grossly symmetric    I&O's Summary    05 Apr 2025 07:01  -  06 Apr 2025 07:00  --------------------------------------------------------  IN: 620 mL / OUT: 1175 mL / NET: -555 mL    06 Apr 2025 07:01  -  07 Apr 2025 05:28  --------------------------------------------------------  IN: 2000 mL / OUT: 400 mL / NET: 1600 mL      I&O's Detail    05 Apr 2025 07:01  -  06 Apr 2025 07:00  --------------------------------------------------------  IN:    IV PiggyBack: 100 mL    Lactated Ringers: 520 mL  Total IN: 620 mL    OUT:    Indwelling Catheter - Urethral (mL): 1175 mL  Total OUT: 1175 mL    Total NET: -555 mL      06 Apr 2025 07:01  -  07 Apr 2025 05:28  --------------------------------------------------------  IN:    IV PiggyBack: 1100 mL    Lactated Ringers: 720 mL    Oral Fluid: 180 mL  Total IN: 2000 mL    OUT:    Voided (mL): 400 mL  Total OUT: 400 mL    Total NET: 1600 mL          MEDICATIONS  (STANDING):  acetaminophen     Tablet .. 1000 milliGRAM(s) Oral every 6 hours  fluticasone propionate/ salmeterol 100-50 MICROgram(s) Diskus 1 Dose(s) Inhalation two times a day  heparin   Injectable 5000 Unit(s) SubCutaneous every 8 hours  lactated ringers. 1000 milliLiter(s) (60 mL/Hr) IV Continuous <Continuous>  pantoprazole  Injectable 40 milliGRAM(s) IV Push daily  potassium phosphate IVPB 30 milliMole(s) IV Intermittent once    MEDICATIONS  (PRN):  albuterol    90 MICROgram(s) HFA Inhaler 2 Puff(s) Inhalation every 6 hours PRN Bronchospasm  oxyCODONE    IR 2.5 milliGRAM(s) Oral every 4 hours PRN Moderate Pain (4 - 6)  oxyCODONE    IR 5 milliGRAM(s) Oral every 4 hours PRN Severe Pain (7 - 10)      LABS:                        11.1   10.45 )-----------( 417      ( 06 Apr 2025 22:56 )             33.0     04-06    142  |  104  |  11  ----------------------------<  88  4.0   |  23  |  0.50    Ca    8.7      06 Apr 2025 22:56  Phos  1.7     04-06  Mg     1.9     04-06      PT/INR - ( 05 Apr 2025 07:46 )   PT: 11.7 sec;   INR: 1.03 ratio         PTT - ( 05 Apr 2025 07:46 )  PTT:30.0 sec  Urinalysis Basic - ( 06 Apr 2025 22:56 )    Color: x / Appearance: x / SG: x / pH: x  Gluc: 88 mg/dL / Ketone: x  / Bili: x / Urobili: x   Blood: x / Protein: x / Nitrite: x   Leuk Esterase: x / RBC: x / WBC x   Sq Epi: x / Non Sq Epi: x / Bacteria: x        RADIOLOGY & ADDITIONAL STUDIES: STATUS POST:  Exploratory laparotomy with lysis of adhesions for closed loop obstruction in pelvis    Vital Signs Last 24 Hrs  T(C): 36.7 (07 Apr 2025 05:13), Max: 37.6 (06 Apr 2025 21:17)  T(F): 98.1 (07 Apr 2025 05:13), Max: 99.6 (06 Apr 2025 21:17)  HR: 97 (07 Apr 2025 05:13) (92 - 107)  BP: 158/84 (07 Apr 2025 05:13) (152/85 - 165/97)  BP(mean): --  RR: 18 (07 Apr 2025 05:13) (18 - 18)  SpO2: 98% (07 Apr 2025 05:13) (97% - 100%)    Parameters below as of 07 Apr 2025 05:13  Patient On (Oxygen Delivery Method): room air    OVERNIGHT: Pt tachycardic (, 165/97) on routine vital check. EKG showed sinus tachycardia 105. Tylenol and 1x 500cc LR bolus administered with mild improvement in tachycardia (, 159/95), LR rate increased from 40 to 60.     SUBJECTIVE: Pt seen and examined at bedside. Pt states she slept okay, she is currently having no pain and feels pain has been well controlled with current regimen. Has not passed flatus or had a BM yet. Tolerating clears, no N/V. Ambulated 4x around the unit throughout the day yesterday, encouraged to continue ambulating. Denies SOB, CP.     Pain: [ ] YES [X ] NO  Pain (0-10): 0             Pain Control Adequate: [X ] YES [ ] NO  SOB: [ ]YES [X ] NO  Chest Discomfort: [ ] YES [ X] NO    Nausea: [ ] YES [ X] NO           Vomiting: [ ] YES [X ] NO  Flatus: [ ] YES [X ] NO             Bowel Movement: [ ] YES [X ] NO     Void: [X ]YES [ ]No    General Appearance: Appears well, NAD  Neck: supple  Chest: Equal expansion bilaterally  CV: Pulse regular presently  Abdomen: Soft, ND, appropriate tenderness to palpation in LLQ and epigastric regions, dressings clean and dry and intact    I&O's Summary    05 Apr 2025 07:01  -  06 Apr 2025 07:00  --------------------------------------------------------  IN: 620 mL / OUT: 1175 mL / NET: -555 mL    06 Apr 2025 07:01  -  07 Apr 2025 05:28  --------------------------------------------------------  IN: 2000 mL / OUT: 400 mL / NET: 1600 mL      I&O's Detail    05 Apr 2025 07:01  -  06 Apr 2025 07:00  --------------------------------------------------------  IN:    IV PiggyBack: 100 mL    Lactated Ringers: 520 mL  Total IN: 620 mL    OUT:    Indwelling Catheter - Urethral (mL): 1175 mL  Total OUT: 1175 mL    Total NET: -555 mL      06 Apr 2025 07:01  -  07 Apr 2025 05:28  --------------------------------------------------------  IN:    IV PiggyBack: 1100 mL    Lactated Ringers: 720 mL    Oral Fluid: 180 mL  Total IN: 2000 mL    OUT:    Voided (mL): 400 mL  Total OUT: 400 mL    Total NET: 1600 mL          MEDICATIONS  (STANDING):  acetaminophen     Tablet .. 1000 milliGRAM(s) Oral every 6 hours  fluticasone propionate/ salmeterol 100-50 MICROgram(s) Diskus 1 Dose(s) Inhalation two times a day  heparin   Injectable 5000 Unit(s) SubCutaneous every 8 hours  lactated ringers. 1000 milliLiter(s) (60 mL/Hr) IV Continuous <Continuous>  pantoprazole  Injectable 40 milliGRAM(s) IV Push daily  potassium phosphate IVPB 30 milliMole(s) IV Intermittent once    MEDICATIONS  (PRN):  albuterol    90 MICROgram(s) HFA Inhaler 2 Puff(s) Inhalation every 6 hours PRN Bronchospasm  oxyCODONE    IR 2.5 milliGRAM(s) Oral every 4 hours PRN Moderate Pain (4 - 6)  oxyCODONE    IR 5 milliGRAM(s) Oral every 4 hours PRN Severe Pain (7 - 10)      LABS:                        11.1   10.45 )-----------( 417      ( 06 Apr 2025 22:56 )             33.0     04-06    142  |  104  |  11  ----------------------------<  88  4.0   |  23  |  0.50    Ca    8.7      06 Apr 2025 22:56  Phos  1.7     04-06  Mg     1.9     04-06      PT/INR - ( 05 Apr 2025 07:46 )   PT: 11.7 sec;   INR: 1.03 ratio         PTT - ( 05 Apr 2025 07:46 )  PTT:30.0 sec  Urinalysis Basic - ( 06 Apr 2025 22:56 )    Color: x / Appearance: x / SG: x / pH: x  Gluc: 88 mg/dL / Ketone: x  / Bili: x / Urobili: x   Blood: x / Protein: x / Nitrite: x   Leuk Esterase: x / RBC: x / WBC x   Sq Epi: x / Non Sq Epi: x / Bacteria: x        RADIOLOGY & ADDITIONAL STUDIES:

## 2025-04-07 NOTE — PROGRESS NOTE ADULT - SUBJECTIVE AND OBJECTIVE BOX
SURGERY DAILY PROGRESS NOTE:     Overnight Events:  Tachycardia overnight with HR 107bpm with no palpitations or SOB. EKG indicated sinus tach. Patient received 500cc bolus x1 and increased IVF to 60cc/hr.    SUBJECTIVE: Patient seen and evaluated on AM rounds. Pt is resting comfortably in bed with no complaints. Denies fever, chills, N/V, chest pain, or shortness of breath. Patient is not passing gas or having bowel movements. Tolerating clears. Ambulating well. Pain is adequately controlled on current regimen.    OBJECTIVE:  Vital Signs Last 24 Hrs  T(C): 36.7 (07 Apr 2025 05:13), Max: 37.6 (06 Apr 2025 21:17)  T(F): 98.1 (07 Apr 2025 05:13), Max: 99.6 (06 Apr 2025 21:17)  HR: 97 (07 Apr 2025 05:13) (92 - 107)  BP: 158/84 (07 Apr 2025 05:13) (152/85 - 165/97)  BP(mean): --  RR: 18 (07 Apr 2025 05:13) (18 - 18)  SpO2: 98% (07 Apr 2025 05:13) (97% - 100%)    Parameters below as of 07 Apr 2025 05:13  Patient On (Oxygen Delivery Method): room air      I&O's Detail    06 Apr 2025 07:01  -  07 Apr 2025 07:00  --------------------------------------------------------  IN:    IV PiggyBack: 1100 mL    Lactated Ringers: 720 mL    Oral Fluid: 180 mL  Total IN: 2000 mL    OUT:    Voided (mL): 400 mL  Total OUT: 400 mL    Total NET: 1600 mL        Daily     Daily     LABS:                        11.1   10.45 )-----------( 417      ( 06 Apr 2025 22:56 )             33.0     04-06    142  |  104  |  11  ----------------------------<  88  4.0   |  23  |  0.50    Ca    8.7      06 Apr 2025 22:56  Phos  1.7     04-06  Mg     1.9     04-06      PT/INR - ( 05 Apr 2025 07:46 )   PT: 11.7 sec;   INR: 1.03 ratio         PTT - ( 05 Apr 2025 07:46 )  PTT:30.0 sec  Urinalysis Basic - ( 06 Apr 2025 22:56 )    Color: x / Appearance: x / SG: x / pH: x  Gluc: 88 mg/dL / Ketone: x  / Bili: x / Urobili: x   Blood: x / Protein: x / Nitrite: x   Leuk Esterase: x / RBC: x / WBC x   Sq Epi: x / Non Sq Epi: x / Bacteria: x      Physical Exam  GEN: resting in bed comfortably in NAD  RESP: no increased WOB  ABD: soft, non-distended, non-tender to palpation without rebound tenderness or guarding  EXTR: warm, well-perfused without gross deformities; spontaneous movement in b/l U/L extrem  NEURO: awake, alert Coumadin

## 2025-04-08 ENCOUNTER — TRANSCRIPTION ENCOUNTER (OUTPATIENT)
Age: 55
End: 2025-04-08

## 2025-04-08 VITALS
OXYGEN SATURATION: 97 % | SYSTOLIC BLOOD PRESSURE: 146 MMHG | DIASTOLIC BLOOD PRESSURE: 83 MMHG | TEMPERATURE: 98 F | HEART RATE: 93 BPM | RESPIRATION RATE: 18 BRPM

## 2025-04-08 LAB
ANION GAP SERPL CALC-SCNC: 16 MMOL/L — SIGNIFICANT CHANGE UP (ref 5–17)
BUN SERPL-MCNC: 6 MG/DL — LOW (ref 7–23)
CALCIUM SERPL-MCNC: 8.8 MG/DL — SIGNIFICANT CHANGE UP (ref 8.4–10.5)
CHLORIDE SERPL-SCNC: 102 MMOL/L — SIGNIFICANT CHANGE UP (ref 96–108)
CO2 SERPL-SCNC: 20 MMOL/L — LOW (ref 22–31)
CREAT SERPL-MCNC: 0.33 MG/DL — LOW (ref 0.5–1.3)
EGFR: 123 ML/MIN/1.73M2 — SIGNIFICANT CHANGE UP
EGFR: 123 ML/MIN/1.73M2 — SIGNIFICANT CHANGE UP
GLUCOSE SERPL-MCNC: 69 MG/DL — LOW (ref 70–99)
HCT VFR BLD CALC: 28.9 % — LOW (ref 34.5–45)
HGB BLD-MCNC: 10 G/DL — LOW (ref 11.5–15.5)
MAGNESIUM SERPL-MCNC: 1.7 MG/DL — SIGNIFICANT CHANGE UP (ref 1.6–2.6)
MCHC RBC-ENTMCNC: 30.6 PG — SIGNIFICANT CHANGE UP (ref 27–34)
MCHC RBC-ENTMCNC: 34.6 G/DL — SIGNIFICANT CHANGE UP (ref 32–36)
MCV RBC AUTO: 88.4 FL — SIGNIFICANT CHANGE UP (ref 80–100)
NRBC BLD AUTO-RTO: 0 /100 WBCS — SIGNIFICANT CHANGE UP (ref 0–0)
PHOSPHATE SERPL-MCNC: 3.2 MG/DL — SIGNIFICANT CHANGE UP (ref 2.5–4.5)
PLATELET # BLD AUTO: 395 K/UL — SIGNIFICANT CHANGE UP (ref 150–400)
POTASSIUM SERPL-MCNC: 3.9 MMOL/L — SIGNIFICANT CHANGE UP (ref 3.5–5.3)
POTASSIUM SERPL-SCNC: 3.9 MMOL/L — SIGNIFICANT CHANGE UP (ref 3.5–5.3)
RBC # BLD: 3.27 M/UL — LOW (ref 3.8–5.2)
RBC # FLD: 13.2 % — SIGNIFICANT CHANGE UP (ref 10.3–14.5)
SODIUM SERPL-SCNC: 138 MMOL/L — SIGNIFICANT CHANGE UP (ref 135–145)
WBC # BLD: 7.8 K/UL — SIGNIFICANT CHANGE UP (ref 3.8–10.5)
WBC # FLD AUTO: 7.8 K/UL — SIGNIFICANT CHANGE UP (ref 3.8–10.5)

## 2025-04-08 PROCEDURE — 85027 COMPLETE CBC AUTOMATED: CPT

## 2025-04-08 PROCEDURE — 83735 ASSAY OF MAGNESIUM: CPT

## 2025-04-08 PROCEDURE — 86901 BLOOD TYPING SEROLOGIC RH(D): CPT

## 2025-04-08 PROCEDURE — 80048 BASIC METABOLIC PNL TOTAL CA: CPT

## 2025-04-08 PROCEDURE — 85730 THROMBOPLASTIN TIME PARTIAL: CPT

## 2025-04-08 PROCEDURE — 36415 COLL VENOUS BLD VENIPUNCTURE: CPT

## 2025-04-08 PROCEDURE — 71045 X-RAY EXAM CHEST 1 VIEW: CPT

## 2025-04-08 PROCEDURE — 93005 ELECTROCARDIOGRAM TRACING: CPT

## 2025-04-08 PROCEDURE — 86850 RBC ANTIBODY SCREEN: CPT

## 2025-04-08 PROCEDURE — 84702 CHORIONIC GONADOTROPIN TEST: CPT

## 2025-04-08 PROCEDURE — 86900 BLOOD TYPING SEROLOGIC ABO: CPT

## 2025-04-08 PROCEDURE — 74177 CT ABD & PELVIS W/CONTRAST: CPT | Mod: MC

## 2025-04-08 PROCEDURE — 85610 PROTHROMBIN TIME: CPT

## 2025-04-08 PROCEDURE — 84100 ASSAY OF PHOSPHORUS: CPT

## 2025-04-08 PROCEDURE — 83605 ASSAY OF LACTIC ACID: CPT

## 2025-04-08 PROCEDURE — C9399: CPT

## 2025-04-08 PROCEDURE — 94640 AIRWAY INHALATION TREATMENT: CPT

## 2025-04-08 PROCEDURE — 74018 RADEX ABDOMEN 1 VIEW: CPT

## 2025-04-08 PROCEDURE — 86923 COMPATIBILITY TEST ELECTRIC: CPT

## 2025-04-08 RX ORDER — ACETAMINOPHEN 500 MG/5ML
2 LIQUID (ML) ORAL
Qty: 0 | Refills: 0 | DISCHARGE
Start: 2025-04-08

## 2025-04-08 RX ADMIN — OXYCODONE HYDROCHLORIDE 2.5 MILLIGRAM(S): 30 TABLET ORAL at 00:13

## 2025-04-08 RX ADMIN — Medication 1000 MILLIGRAM(S): at 11:23

## 2025-04-08 RX ADMIN — Medication 40 MILLIGRAM(S): at 11:23

## 2025-04-08 RX ADMIN — Medication 1000 MILLIGRAM(S): at 05:46

## 2025-04-08 RX ADMIN — Medication 1000 MILLIGRAM(S): at 12:05

## 2025-04-08 RX ADMIN — Medication 1000 MILLIGRAM(S): at 05:16

## 2025-04-08 RX ADMIN — HEPARIN SODIUM 5000 UNIT(S): 1000 INJECTION INTRAVENOUS; SUBCUTANEOUS at 08:03

## 2025-04-08 RX ADMIN — HEPARIN SODIUM 5000 UNIT(S): 1000 INJECTION INTRAVENOUS; SUBCUTANEOUS at 00:19

## 2025-04-08 RX ADMIN — Medication 1 DOSE(S): at 05:02

## 2025-04-08 NOTE — PROGRESS NOTE ADULT - SUBJECTIVE AND OBJECTIVE BOX
SURGERY DAILY PROGRESS NOTE:     Overnight Events:  Some epigastric pain overnight. Resolved with protonix and some oxycodone.    SUBJECTIVE: Patient seen and evaluated on AM rounds. Pt is resting comfortably in bed with no complaints. Denies fever, chills, N/V, chest pain, or shortness of breath. Patient is passing gas and having bowel movements. Tolerating diet. Ambulating well. Pain is adequately controlled on current regimen.    OBJECTIVE:  Vital Signs Last 24 Hrs  T(C): 36.8 (08 Apr 2025 06:33), Max: 37.2 (07 Apr 2025 16:43)  T(F): 98.2 (08 Apr 2025 06:33), Max: 99 (07 Apr 2025 16:43)  HR: 88 (08 Apr 2025 06:33) (88 - 100)  BP: 143/85 (08 Apr 2025 06:33) (135/86 - 157/93)  BP(mean): --  RR: 18 (08 Apr 2025 06:33) (18 - 18)  SpO2: 98% (08 Apr 2025 06:33) (96% - 99%)    Parameters below as of 08 Apr 2025 06:33  Patient On (Oxygen Delivery Method): room air      I&O's Detail    07 Apr 2025 07:01  -  08 Apr 2025 07:00  --------------------------------------------------------  IN:    Lactated Ringers: 720 mL  Total IN: 720 mL    OUT:    Voided (mL): 2000 mL  Total OUT: 2000 mL    Total NET: -1280 mL        Daily     Daily     LABS:                        10.4   9.30  )-----------( 383      ( 07 Apr 2025 07:11 )             30.2     04-07    140  |  103  |  8   ----------------------------<  92  3.5   |  24  |  0.39[L]    Ca    8.2[L]      07 Apr 2025 07:11  Phos  3.9     04-07  Mg     2.1     04-07        Urinalysis Basic - ( 07 Apr 2025 07:11 )    Color: x / Appearance: x / SG: x / pH: x  Gluc: 92 mg/dL / Ketone: x  / Bili: x / Urobili: x   Blood: x / Protein: x / Nitrite: x   Leuk Esterase: x / RBC: x / WBC x   Sq Epi: x / Non Sq Epi: x / Bacteria: x        Physical Exam  GEN: resting in bed comfortably in NAD  RESP: no increased WOB  ABD: soft, non-distended, non-tender to palpation without rebound tenderness or guarding, staples in place  EXTR: warm, well-perfused without gross deformities; spontaneous movement in b/l U/L extrem  NEURO: awake, alert

## 2025-04-08 NOTE — DISCHARGE NOTE NURSING/CASE MANAGEMENT/SOCIAL WORK - PATIENT PORTAL LINK FT
You can access the FollowMyHealth Patient Portal offered by [facility] by registering at the following website: [email or web address]. By joining [facility]’s FollowMyHealth portal, you will also be able to view your health information using other applications (apps) compatible with our system.

## 2025-04-08 NOTE — PROGRESS NOTE ADULT - ASSESSMENT
Bette Ta is a 54 y.o. F with PMH of Discoid Lupus, HTN, Asthma, CAD,  x3, Partial Hysterectomy (), chronic back pain s/p Laminectomy L4-L5 (), Umbilical Hernia Repair (2013), and Small Bowel Resection with Abdominal Wall Reconstruction who is now s/p exploratory laparotomy with lysis of adhesions for closed loop obstruction in pelvis on . Recovering appropriately. -/-    Plan:   - Pain control as needed- acetaminophen 1000mg PO q6H, PRN oxycodone 2.5mg PO for moderate pain, 5mg PO for severe pain  - LR @ 60  - Diet: LRD   - OOB and ambulating as tolerated  - Home inhalers restarted  - DVT ppx: SQH Bette Ta is a 54 y.o. F with PMH of Discoid Lupus, HTN, Asthma, CAD,  x3, Partial Hysterectomy (), chronic back pain s/p Laminectomy L4-L5 (), Umbilical Hernia Repair (2013), and Small Bowel Resection with Abdominal Wall Reconstruction who is now s/p exploratory laparotomy with lysis of adhesions for closed loop obstruction in pelvis on . Recovering appropriately. +/+. d/c in afternoon.     Plan:   - Pain control as needed- acetaminophen 1000mg PO q6H, PRN oxycodone 2.5mg PO for moderate pain, 5mg PO for severe pain  - Diet: LRD   - OOB and ambulating as tolerated  - Home inhalers restarted  - DVT ppx: SQH  - d/c this afternoon

## 2025-04-08 NOTE — PROGRESS NOTE ADULT - ASSESSMENT
54F with a pmhx of Discoid Lupus, HTN, Asthma, CAD,  x3, Partial Hysterectomy (), chronic back pain s/p Laminectomy L4-L5 (), Umbilical Hernia Repair (2013), and Small Bowel Resection with Abdominal Wall Reconstruction, and Umbilical Hernia who has been transferred from [facility] to [facility] for surgical management of a SBO. 4/5 s/p exploratory laparotomy with lysis of adhesions for closed loop obstruction in pelvis. Recovering appropriately.     Plan:  - Pain control as needed  - Diet- LRD  - DVT ppx  - OOB and ambulating as tolerated  - restarted home inhalers   - Dispo- anticipate dc today      [facility]  [phone] (pager)

## 2025-04-08 NOTE — DISCHARGE NOTE NURSING/CASE MANAGEMENT/SOCIAL WORK - FINANCIAL ASSISTANCE
[facility] provides services at a reduced cost to those who are determined to be eligible through [facility]’s financial assistance program. Information regarding [facility]’s financial assistance program can be found by going to [email or web address] or by calling [phone].

## 2025-04-08 NOTE — PROGRESS NOTE ADULT - SUBJECTIVE AND OBJECTIVE BOX
STATUS POST: Exploratory laparotomy with lysis of adhesions for closed loop obstruction in pelvis    Vital Signs Last 24 Hrs  T(C): 36.8 (08 Apr 2025 00:40), Max: 37.2 (07 Apr 2025 16:43)  T(F): 98.2 (08 Apr 2025 00:40), Max: 99 (07 Apr 2025 16:43)  HR: 93 (08 Apr 2025 00:40) (93 - 100)  BP: 138/89 (08 Apr 2025 00:40) (135/86 - 157/93)  BP(mean): --  RR: 18 (08 Apr 2025 00:40) (18 - 18)  SpO2: 98% (08 Apr 2025 00:40) (96% - 99%)    Parameters below as of 08 Apr 2025 00:40  Patient On (Oxygen Delivery Method): room air      OVERNIGHT: No overnight events    SUBJECTIVE: Pt seen and examined at bedside.     Pain: [ ] YES [ ] NO  Pain (0-10):              Pain Control Adequate: [ ] YES [ ] NO  SOB: [ ]YES [ ] NO  Chest Discomfort: [ ] YES [ ] NO    Nausea: [ ] YES [ ] NO           Vomiting: [ ] YES [ ] NO  Flatus: [ ] YES [ ] NO             Bowel Movement: [ ] YES [ ] NO     Void: [ ]YES [ ]No      General Appearance: Appears well, NAD  Chest: Equal expansion bilaterally  CV: Pulse regular presently  Abdomen: Soft, ND, appropriate incisional tenderness, dressings clean and dry and intact     I&O's Summary    06 Apr 2025 07:01  -  07 Apr 2025 07:00  --------------------------------------------------------  IN: 2000 mL / OUT: 400 mL / NET: 1600 mL    07 Apr 2025 07:01  -  08 Apr 2025 05:21  --------------------------------------------------------  IN: 720 mL / OUT: 1500 mL / NET: -780 mL      I&O's Detail    06 Apr 2025 07:01  -  07 Apr 2025 07:00  --------------------------------------------------------  IN:    IV PiggyBack: 1100 mL    Lactated Ringers: 720 mL    Oral Fluid: 180 mL  Total IN: 2000 mL    OUT:    Voided (mL): 400 mL  Total OUT: 400 mL    Total NET: 1600 mL      07 Apr 2025 07:01  -  08 Apr 2025 05:21  --------------------------------------------------------  IN:    Lactated Ringers: 720 mL  Total IN: 720 mL    OUT:    Voided (mL): 1500 mL  Total OUT: 1500 mL    Total NET: -780 mL          MEDICATIONS  (STANDING):  acetaminophen     Tablet .. 1000 milliGRAM(s) Oral every 6 hours  fluticasone propionate/ salmeterol 100-50 MICROgram(s) Diskus 1 Dose(s) Inhalation two times a day  heparin   Injectable 5000 Unit(s) SubCutaneous every 8 hours  lactated ringers. 1000 milliLiter(s) (60 mL/Hr) IV Continuous <Continuous>  pantoprazole  Injectable 40 milliGRAM(s) IV Push daily    MEDICATIONS  (PRN):  albuterol    90 MICROgram(s) HFA Inhaler 2 Puff(s) Inhalation every 6 hours PRN Bronchospasm  ondansetron Injectable 4 milliGRAM(s) IV Push every 8 hours PRN Nausea and/or Vomiting  oxyCODONE    IR 5 milliGRAM(s) Oral every 4 hours PRN Severe Pain (7 - 10)  oxyCODONE    IR 2.5 milliGRAM(s) Oral every 4 hours PRN Moderate Pain (4 - 6)      LABS:                        10.4   9.30  )-----------( 383      ( 07 Apr 2025 07:11 )             30.2     04-07    140  |  103  |  8   ----------------------------<  92  3.5   |  24  |  0.39[L]    Ca    8.2[L]      07 Apr 2025 07:11  Phos  3.9     04-07  Mg     2.1     04-07        Urinalysis Basic - ( 07 Apr 2025 07:11 )    Color: x / Appearance: x / SG: x / pH: x  Gluc: 92 mg/dL / Ketone: x  / Bili: x / Urobili: x   Blood: x / Protein: x / Nitrite: x   Leuk Esterase: x / RBC: x / WBC x   Sq Epi: x / Non Sq Epi: x / Bacteria: x        RADIOLOGY & ADDITIONAL STUDIES: STATUS POST: Exploratory laparotomy with lysis of adhesions for closed loop obstruction in pelvis    Vital Signs Last 24 Hrs  T(C): 36.8 (08 Apr 2025 00:40), Max: 37.2 (07 Apr 2025 16:43)  T(F): 98.2 (08 Apr 2025 00:40), Max: 99 (07 Apr 2025 16:43)  HR: 93 (08 Apr 2025 00:40) (93 - 100)  BP: 138/89 (08 Apr 2025 00:40) (135/86 - 157/93)  BP(mean): --  RR: 18 (08 Apr 2025 00:40) (18 - 18)  SpO2: 98% (08 Apr 2025 00:40) (96% - 99%)    Parameters below as of 08 Apr 2025 00:40  Patient On (Oxygen Delivery Method): room air      SUBJECTIVE: Pt seen and examined at bedside. Around 11pm- midnight, she had a burning sensation in her epigastric region that radiated to R side, resolved with protonix and 2.5mg Oxy. She states she slept well. Had 1 small BM yesterday, is passing flatus. Has no current pain. Ambulated 4x around unit yesterday. Is tolerating LRD with slight nausea that has since resolved. Denies CP, SOB, vomiting. Dressings changed during rounds.     Pain: [ ] YES [X ] NO  Pain (0-10): 0              Pain Control Adequate: [X ] YES [ ] NO  SOB: [ ]YES [ X] NO  Chest Discomfort: [ ] YES [X ] NO    Nausea: [ ] YES [X ] NO           Vomiting: [ ] YES [X ] NO  Flatus: [X ] YES [ ] NO             Bowel Movement: [X ] YES [ ] NO     Void: [ X]YES [ ]No      General Appearance: Appears well, NAD  Chest: Equal expansion bilaterally  CV: Pulse regular presently  Abdomen: Soft, ND, no tenderness to palpation, dressings clean and dry and intact     I&O's Summary    06 Apr 2025 07:01  -  07 Apr 2025 07:00  --------------------------------------------------------  IN: 2000 mL / OUT: 400 mL / NET: 1600 mL    07 Apr 2025 07:01  -  08 Apr 2025 05:21  --------------------------------------------------------  IN: 720 mL / OUT: 1500 mL / NET: -780 mL      I&O's Detail    06 Apr 2025 07:01  -  07 Apr 2025 07:00  --------------------------------------------------------  IN:    IV PiggyBack: 1100 mL    Lactated Ringers: 720 mL    Oral Fluid: 180 mL  Total IN: 2000 mL    OUT:    Voided (mL): 400 mL  Total OUT: 400 mL    Total NET: 1600 mL      07 Apr 2025 07:01  -  08 Apr 2025 05:21  --------------------------------------------------------  IN:    Lactated Ringers: 720 mL  Total IN: 720 mL    OUT:    Voided (mL): 1500 mL  Total OUT: 1500 mL    Total NET: -780 mL          MEDICATIONS  (STANDING):  acetaminophen     Tablet .. 1000 milliGRAM(s) Oral every 6 hours  fluticasone propionate/ salmeterol 100-50 MICROgram(s) Diskus 1 Dose(s) Inhalation two times a day  heparin   Injectable 5000 Unit(s) SubCutaneous every 8 hours  lactated ringers. 1000 milliLiter(s) (60 mL/Hr) IV Continuous <Continuous>  pantoprazole  Injectable 40 milliGRAM(s) IV Push daily    MEDICATIONS  (PRN):  albuterol    90 MICROgram(s) HFA Inhaler 2 Puff(s) Inhalation every 6 hours PRN Bronchospasm  ondansetron Injectable 4 milliGRAM(s) IV Push every 8 hours PRN Nausea and/or Vomiting  oxyCODONE    IR 5 milliGRAM(s) Oral every 4 hours PRN Severe Pain (7 - 10)  oxyCODONE    IR 2.5 milliGRAM(s) Oral every 4 hours PRN Moderate Pain (4 - 6)      LABS:                        10.4   9.30  )-----------( 383      ( 07 Apr 2025 07:11 )             30.2     04-07    140  |  103  |  8   ----------------------------<  92  3.5   |  24  |  0.39[L]    Ca    8.2[L]      07 Apr 2025 07:11  Phos  3.9     04-07  Mg     2.1     04-07        Urinalysis Basic - ( 07 Apr 2025 07:11 )    Color: x / Appearance: x / SG: x / pH: x  Gluc: 92 mg/dL / Ketone: x  / Bili: x / Urobili: x   Blood: x / Protein: x / Nitrite: x   Leuk Esterase: x / RBC: x / WBC x   Sq Epi: x / Non Sq Epi: x / Bacteria: x        RADIOLOGY & ADDITIONAL STUDIES: STATUS POST: Exploratory laparotomy with lysis of adhesions for closed loop obstruction in pelvis    Vital Signs Last 24 Hrs  T(C): 36.8 (08 Apr 2025 00:40), Max: 37.2 (07 Apr 2025 16:43)  T(F): 98.2 (08 Apr 2025 00:40), Max: 99 (07 Apr 2025 16:43)  HR: 93 (08 Apr 2025 00:40) (93 - 100)  BP: 138/89 (08 Apr 2025 00:40) (135/86 - 157/93)  BP(mean): --  RR: 18 (08 Apr 2025 00:40) (18 - 18)  SpO2: 98% (08 Apr 2025 00:40) (96% - 99%)    Parameters below as of 08 Apr 2025 00:40  Patient On (Oxygen Delivery Method): room air    OVERNIGHT: Around 11pm- midnight, she had a burning sensation in her epigastric region that radiated to R side, resolved with protonix and 2.5mg Oxy.     SUBJECTIVE: Pt seen and examined at bedside. She states she slept well. Had 1 small BM yesterday, is passing flatus. Has no current pain. Ambulated 4x around unit yesterday. Is tolerating LRD with slight nausea that has since resolved. Denies CP, SOB, vomiting. Dressings changed during rounds.     Pain: [ ] YES [X ] NO  Pain (0-10): 0              Pain Control Adequate: [X ] YES [ ] NO  SOB: [ ]YES [ X] NO  Chest Discomfort: [ ] YES [X ] NO    Nausea: [ ] YES [X ] NO           Vomiting: [ ] YES [X ] NO  Flatus: [X ] YES [ ] NO             Bowel Movement: [X ] YES [ ] NO     Void: [ X]YES [ ]No      General Appearance: Appears well, NAD  Chest: Equal expansion bilaterally  CV: Pulse regular presently  Abdomen: Soft, ND, no tenderness to palpation, dressings clean and dry and intact     I&O's Summary    06 Apr 2025 07:01  -  07 Apr 2025 07:00  --------------------------------------------------------  IN: 2000 mL / OUT: 400 mL / NET: 1600 mL    07 Apr 2025 07:01  -  08 Apr 2025 05:21  --------------------------------------------------------  IN: 720 mL / OUT: 1500 mL / NET: -780 mL      I&O's Detail    06 Apr 2025 07:01  -  07 Apr 2025 07:00  --------------------------------------------------------  IN:    IV PiggyBack: 1100 mL    Lactated Ringers: 720 mL    Oral Fluid: 180 mL  Total IN: 2000 mL    OUT:    Voided (mL): 400 mL  Total OUT: 400 mL    Total NET: 1600 mL      07 Apr 2025 07:01  -  08 Apr 2025 05:21  --------------------------------------------------------  IN:    Lactated Ringers: 720 mL  Total IN: 720 mL    OUT:    Voided (mL): 1500 mL  Total OUT: 1500 mL    Total NET: -780 mL          MEDICATIONS  (STANDING):  acetaminophen     Tablet .. 1000 milliGRAM(s) Oral every 6 hours  fluticasone propionate/ salmeterol 100-50 MICROgram(s) Diskus 1 Dose(s) Inhalation two times a day  heparin   Injectable 5000 Unit(s) SubCutaneous every 8 hours  lactated ringers. 1000 milliLiter(s) (60 mL/Hr) IV Continuous <Continuous>  pantoprazole  Injectable 40 milliGRAM(s) IV Push daily    MEDICATIONS  (PRN):  albuterol    90 MICROgram(s) HFA Inhaler 2 Puff(s) Inhalation every 6 hours PRN Bronchospasm  ondansetron Injectable 4 milliGRAM(s) IV Push every 8 hours PRN Nausea and/or Vomiting  oxyCODONE    IR 5 milliGRAM(s) Oral every 4 hours PRN Severe Pain (7 - 10)  oxyCODONE    IR 2.5 milliGRAM(s) Oral every 4 hours PRN Moderate Pain (4 - 6)      LABS:                        10.4   9.30  )-----------( 383      ( 07 Apr 2025 07:11 )             30.2     04-07    140  |  103  |  8   ----------------------------<  92  3.5   |  24  |  0.39[L]    Ca    8.2[L]      07 Apr 2025 07:11  Phos  3.9     04-07  Mg     2.1     04-07        Urinalysis Basic - ( 07 Apr 2025 07:11 )    Color: x / Appearance: x / SG: x / pH: x  Gluc: 92 mg/dL / Ketone: x  / Bili: x / Urobili: x   Blood: x / Protein: x / Nitrite: x   Leuk Esterase: x / RBC: x / WBC x   Sq Epi: x / Non Sq Epi: x / Bacteria: x        RADIOLOGY & ADDITIONAL STUDIES:

## 2025-04-08 NOTE — PROGRESS NOTE ADULT - REASON FOR ADMISSION
Small Bowel Obstruction

## 2025-04-08 NOTE — DISCHARGE NOTE NURSING/CASE MANAGEMENT/SOCIAL WORK - NSDCPEFALRISK_GEN_ALL_CORE
For information on Fall & Injury Prevention, visit: [email or web address] OR  [email or web address] OR  https://www.cdc.gov/steadi/patient.html

## 2025-04-14 ENCOUNTER — NON-APPOINTMENT (OUTPATIENT)
Age: 55
End: 2025-04-14

## 2025-04-18 ENCOUNTER — APPOINTMENT (OUTPATIENT)
Dept: COLORECTAL SURGERY | Facility: CLINIC | Age: 55
End: 2025-04-18

## 2025-04-18 PROCEDURE — 99024 POSTOP FOLLOW-UP VISIT: CPT

## 2025-05-01 ENCOUNTER — EMERGENCY (EMERGENCY)
Facility: HOSPITAL | Age: 55
LOS: 1 days | End: 2025-05-01
Attending: STUDENT IN AN ORGANIZED HEALTH CARE EDUCATION/TRAINING PROGRAM
Payer: COMMERCIAL

## 2025-05-01 VITALS
WEIGHT: 138.01 LBS | HEART RATE: 94 BPM | HEIGHT: 61 IN | RESPIRATION RATE: 20 BRPM | TEMPERATURE: 98 F | DIASTOLIC BLOOD PRESSURE: 69 MMHG | OXYGEN SATURATION: 98 % | SYSTOLIC BLOOD PRESSURE: 113 MMHG

## 2025-05-01 LAB
ALBUMIN SERPL ELPH-MCNC: 4.3 G/DL — SIGNIFICANT CHANGE UP (ref 3.3–5)
ALP SERPL-CCNC: 86 U/L — SIGNIFICANT CHANGE UP (ref 40–120)
ALT FLD-CCNC: 13 U/L — SIGNIFICANT CHANGE UP (ref 10–45)
ANION GAP SERPL CALC-SCNC: 15 MMOL/L — SIGNIFICANT CHANGE UP (ref 5–17)
AST SERPL-CCNC: 14 U/L — SIGNIFICANT CHANGE UP (ref 10–40)
BASOPHILS # BLD AUTO: 0.07 K/UL — SIGNIFICANT CHANGE UP (ref 0–0.2)
BASOPHILS NFR BLD AUTO: 1 % — SIGNIFICANT CHANGE UP (ref 0–2)
BILIRUB SERPL-MCNC: 0.4 MG/DL — SIGNIFICANT CHANGE UP (ref 0.2–1.2)
BUN SERPL-MCNC: 12 MG/DL — SIGNIFICANT CHANGE UP (ref 7–23)
CALCIUM SERPL-MCNC: 9.6 MG/DL — SIGNIFICANT CHANGE UP (ref 8.4–10.5)
CHLORIDE SERPL-SCNC: 104 MMOL/L — SIGNIFICANT CHANGE UP (ref 96–108)
CO2 SERPL-SCNC: 22 MMOL/L — SIGNIFICANT CHANGE UP (ref 22–31)
CREAT SERPL-MCNC: 0.43 MG/DL — LOW (ref 0.5–1.3)
EGFR: 116 ML/MIN/1.73M2 — SIGNIFICANT CHANGE UP
EGFR: 116 ML/MIN/1.73M2 — SIGNIFICANT CHANGE UP
EOSINOPHIL # BLD AUTO: 0.41 K/UL — SIGNIFICANT CHANGE UP (ref 0–0.5)
EOSINOPHIL NFR BLD AUTO: 5.6 % — SIGNIFICANT CHANGE UP (ref 0–6)
GAS PNL BLDV: SIGNIFICANT CHANGE UP
GLUCOSE SERPL-MCNC: 105 MG/DL — HIGH (ref 70–99)
HCT VFR BLD CALC: 35.1 % — SIGNIFICANT CHANGE UP (ref 34.5–45)
HGB BLD-MCNC: 11.6 G/DL — SIGNIFICANT CHANGE UP (ref 11.5–15.5)
IMM GRANULOCYTES NFR BLD AUTO: 0.5 % — SIGNIFICANT CHANGE UP (ref 0–0.9)
LIDOCAIN IGE QN: 70 U/L — HIGH (ref 7–60)
LYMPHOCYTES # BLD AUTO: 2.43 K/UL — SIGNIFICANT CHANGE UP (ref 1–3.3)
LYMPHOCYTES # BLD AUTO: 33.1 % — SIGNIFICANT CHANGE UP (ref 13–44)
MCHC RBC-ENTMCNC: 30.4 PG — SIGNIFICANT CHANGE UP (ref 27–34)
MCHC RBC-ENTMCNC: 33 G/DL — SIGNIFICANT CHANGE UP (ref 32–36)
MCV RBC AUTO: 91.9 FL — SIGNIFICANT CHANGE UP (ref 80–100)
MONOCYTES # BLD AUTO: 0.64 K/UL — SIGNIFICANT CHANGE UP (ref 0–0.9)
MONOCYTES NFR BLD AUTO: 8.7 % — SIGNIFICANT CHANGE UP (ref 2–14)
NEUTROPHILS # BLD AUTO: 3.76 K/UL — SIGNIFICANT CHANGE UP (ref 1.8–7.4)
NEUTROPHILS NFR BLD AUTO: 51.1 % — SIGNIFICANT CHANGE UP (ref 43–77)
NRBC BLD AUTO-RTO: 0 /100 WBCS — SIGNIFICANT CHANGE UP (ref 0–0)
PLATELET # BLD AUTO: 456 K/UL — HIGH (ref 150–400)
POTASSIUM SERPL-MCNC: 4 MMOL/L — SIGNIFICANT CHANGE UP (ref 3.5–5.3)
POTASSIUM SERPL-SCNC: 4 MMOL/L — SIGNIFICANT CHANGE UP (ref 3.5–5.3)
PROT SERPL-MCNC: 6.9 G/DL — SIGNIFICANT CHANGE UP (ref 6–8.3)
RBC # BLD: 3.82 M/UL — SIGNIFICANT CHANGE UP (ref 3.8–5.2)
RBC # FLD: 13.4 % — SIGNIFICANT CHANGE UP (ref 10.3–14.5)
SODIUM SERPL-SCNC: 141 MMOL/L — SIGNIFICANT CHANGE UP (ref 135–145)
WBC # BLD: 7.35 K/UL — SIGNIFICANT CHANGE UP (ref 3.8–10.5)
WBC # FLD AUTO: 7.35 K/UL — SIGNIFICANT CHANGE UP (ref 3.8–10.5)

## 2025-05-01 PROCEDURE — 83690 ASSAY OF LIPASE: CPT

## 2025-05-01 PROCEDURE — 82330 ASSAY OF CALCIUM: CPT

## 2025-05-01 PROCEDURE — 99285 EMERGENCY DEPT VISIT HI MDM: CPT | Mod: 25

## 2025-05-01 PROCEDURE — 86901 BLOOD TYPING SEROLOGIC RH(D): CPT

## 2025-05-01 PROCEDURE — 85014 HEMATOCRIT: CPT

## 2025-05-01 PROCEDURE — 74177 CT ABD & PELVIS W/CONTRAST: CPT | Mod: MC

## 2025-05-01 PROCEDURE — 87040 BLOOD CULTURE FOR BACTERIA: CPT

## 2025-05-01 PROCEDURE — 82947 ASSAY GLUCOSE BLOOD QUANT: CPT

## 2025-05-01 PROCEDURE — 86850 RBC ANTIBODY SCREEN: CPT

## 2025-05-01 PROCEDURE — 36000 PLACE NEEDLE IN VEIN: CPT | Mod: XU

## 2025-05-01 PROCEDURE — 85025 COMPLETE CBC W/AUTO DIFF WBC: CPT

## 2025-05-01 PROCEDURE — 85018 HEMOGLOBIN: CPT

## 2025-05-01 PROCEDURE — 84295 ASSAY OF SERUM SODIUM: CPT

## 2025-05-01 PROCEDURE — 83605 ASSAY OF LACTIC ACID: CPT

## 2025-05-01 PROCEDURE — 93005 ELECTROCARDIOGRAM TRACING: CPT

## 2025-05-01 PROCEDURE — 74177 CT ABD & PELVIS W/CONTRAST: CPT | Mod: 26

## 2025-05-01 PROCEDURE — 86900 BLOOD TYPING SEROLOGIC ABO: CPT

## 2025-05-01 PROCEDURE — 93010 ELECTROCARDIOGRAM REPORT: CPT

## 2025-05-01 PROCEDURE — 80053 COMPREHEN METABOLIC PANEL: CPT

## 2025-05-01 PROCEDURE — 84132 ASSAY OF SERUM POTASSIUM: CPT

## 2025-05-01 PROCEDURE — 82803 BLOOD GASES ANY COMBINATION: CPT

## 2025-05-01 PROCEDURE — 99285 EMERGENCY DEPT VISIT HI MDM: CPT

## 2025-05-01 PROCEDURE — 82435 ASSAY OF BLOOD CHLORIDE: CPT

## 2025-05-01 RX ADMIN — Medication 1000 MILLILITER(S): at 15:45

## 2025-05-01 NOTE — ED PROVIDER NOTE - ATTENDING CONTRIBUTION TO CARE
54-year-old female with past medical history of lupus, hypertension, asthma, CAD, prior hysterectomy and hernia repair complicated by bowel obstruction requiring bowel obstruction with recent admission for recurrent bowel obstruction requiring ex lap presents with abdominal discomfort.  She reports for the past few days, she noted mid abdominal discomfort and tightness and is concerned about abdominal mass or infection postop.  She denies nausea, vomiting, diarrhea or constipation.    PE: well appearing, nontoxic, no respiratory distress.  Abdomen soft, mild discomfort in periumbilical region, prior surgical scar intact with no erythema or drainage. Neuro nonfocal.  Skin intact. Psych normal mood.    MDM: Differential diagnosis includes but is not limited to hernia, seroma, intraabdominal abscess   Will reassess with CT imaging and consult CRS as indicated given post op problem

## 2025-05-01 NOTE — ED ADULT NURSE REASSESSMENT NOTE - NS ED NURSE REASSESS COMMENT FT1
Received report from RN Danica. Introduced self to Patient, resting comfortably, breathing unlabored, VSS, Non-febrile at this time. Pt well appearing, in no current distress. Skin warm and dry and of color appropriate for ethnicity , moves all extremities, speech clear. side rails raised, call bell within reach, comfort and safety maintained.  pending dispo. no further nurse intervention needed at this time

## 2025-05-01 NOTE — ED ADULT NURSE NOTE - OBJECTIVE STATEMENT
Pt is a 53 y/o female pmhx od SOB, hernia repair presents to the ED c/o abd pain. States about 4 weeks ago she had SBO repair here at [facility]. Since MOnday she has had upper abdominal bloating/discomfort that's worsened.  Has been having episodes of chills and waking up in the middle of the night sweating. Still tolerating PO, passing gas. Pt presents alert & oriented x 4, calm, able to follow commands, speech clear. Breathing spontaneous & nonlabored. Abdomen soft & nondistended, RUQ TTP. Strength 5/5 x 4 extremities, ambulates without assist. Strong peripheral pulses noted b/l, no edema noted. Skin warm, clean, dry & intact. Denies chest pain, SOB, urinary symptoms, back pain, n/v/d, fever. Call bell within reach and pt instructed on how to use, bed in lowest position, side rails up, wheels locked. Break coverage LIGIA Roberts

## 2025-05-01 NOTE — ED PROVIDER NOTE - IV ALTEPLASE INCLUSION HIDDEN
HISTORY OF PRESENT ILLNESS: Ruslan Hardin is a 38 year old male who comes in today complaining of Viral Syndrome (emesis x 1since late Saturday night. Emesis again Sunday morning x 1. No diarrhea. Family members at home ill.    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 )  .  Patient resents with complaints of a last 2 days of some ongoing nausea vomiting no diarrhea. Was sent home from work because of it works as a . No unusual food intake no other family members been ill recently. No recent antibiotic use. Has felt somewhat fatigued in general.  I have reviewed the patient's medications and allergies, past medical, surgical, social and family history, updating these as appropriate.  See Histories section of the EMR for a display of this information...       REVIEW OF SYSTEMS:   Otherwise negative    PHYSICAL EXAMINATION:  Vitals:    10/30/17 0736   BP: 128/82   Pulse: 81   Resp: 16   Temp: 97.8 °F (36.6 °C)     Mucous membranes are moist. Neck is otherwise supple lungs appear clear abdomen shows normoactive bowel sounds mild diffuse tenderness no peritoneal signs are noted. He is otherwise alert up ambulatory.          ASSESSMENT/PLAN:  1. Nausea and vomiting in adult  Clear liquids advance diet as tolerated work excuse given  - ondansetron (ZOFRAN ODT) 4 MG disintegrating tablet; Take 1 tablet by mouth every 8 hours as needed for Nausea.  Dispense: 12 tablet; Refill: 0         show

## 2025-05-01 NOTE — ED PROVIDER NOTE - CLINICAL SUMMARY MEDICAL DECISION MAKING FREE TEXT BOX
53 y/o female Discoid Lupus, Hypertension, Asthma, CAD,  x3, Partial Hysterectomy (), chronic back pain s/p Laminectomy L4-L5 (), multiple abdominal surgeries w/ Dr. Borrego, most recently had SBO w/ lysis adhesions w/ Dr. Scherer 25 presents with a few days of epigastric pain/bloating associated w/ night sweats/chills. She is hemodynamically stable, afebrile, on exam she is comfortable appearing with even, unlabored respirations. Abdomen is soft, mildly tender in epigastric area. Surgical wound clean, dry, no surrounding erythema/induration. No scleral icterus. Will eval for abscess vs sbo w/ labs, imaging, surgery consult.

## 2025-05-01 NOTE — ED PROVIDER NOTE - PATIENT PORTAL LINK FT
You can access the FollowMyHealth Patient Portal offered by [facility] by registering at the following website: [email or web address]. By joining [facility]’s FollowMyHealth portal, you will also be able to view your health information using other applications (apps) compatible with our system.

## 2025-05-01 NOTE — ED ADULT NURSE NOTE - NSFALLUNIVINTERV_ED_ALL_ED
Bed/Stretcher in lowest position, wheels locked, appropriate side rails in place/Call bell, personal items and telephone in reach/Instruct patient to call for assistance before getting out of bed/chair/stretcher/Non-slip footwear applied when patient is off stretcher/[place] to call system/Physically safe environment - no spills, clutter or unnecessary equipment/Purposeful proactive rounding/Room/bathroom lighting operational, light cord in reach

## 2025-05-01 NOTE — ED PROVIDER NOTE - DATE/TIME 2
.12 days  CGA  36 5/7  BW 2010g  . Wt Readings from Last 6 Encounters:  07/23/17 : 2390 g (5 lb 4.3 oz) (<1 %, Z < -2.33)*    * Growth percentiles are based on WHO (Boys, 0-2 years) data. .Weight change: 24 g (0.9 oz)    . Interval Summary:  1.  Stable overn Immunizations:  . There is no immunization history on file for this patient. 01-May-2025 19:28

## 2025-05-01 NOTE — ED ADULT TRIAGE NOTE - HEART RATE (BEATS/MIN)
After Visit Summary   5/23/2017    Konstantin Sharp    MRN: [id number]           Patient Information     Date Of Birth          1968        Visit Information        Provider Department      5/23/2017 10:30 AM Niru Chairez MD [facility], [place], Sleep Study        Today's Diagnoses     CLARY (obstructive sleep apnea)    -  1      Care Instructions      Your BMI is Body mass index is 35.72 kg/(m^2).  Weight management is a personal decision.  If you are interested in exploring weight loss strategies, the following discussion covers the approaches that may be successful. Body mass index (BMI) is one way to tell whether you are at a healthy weight, overweight, or obese. It measures your weight in relation to your height.  A BMI of 18.5 to 24.9 is in the healthy range. A person with a BMI of 25 to 29.9 is considered overweight, and someone with a BMI of 30 or greater is considered obese. More than two-thirds of American adults are considered overweight or obese.  Being overweight or obese increases the risk for further weight gain. Excess weight may lead to heart disease and diabetes.  Creating and following plans for healthy eating and physical activity may help you improve your health.  Weight control is part of healthy lifestyle and includes exercise, emotional health, and healthy eating habits. Careful eating habits lifelong are the mainstay of weight control. Though there are significant health benefits from weight loss, long-term weight loss with diet alone may be very difficult to achieve- studies show long-term success with dietary management in less than 10% of people. Attaining a healthy weight may be especially difficult to achieve in those with severe obesity. In some cases, medications, devices and surgical management might be considered.  What can you do?  If you are overweight or obese and are interested in methods for weight loss, you should discuss this with your  provider.     Consider reducing daily calorie intake by 500 calories.     Keep a food journal.     Avoiding skipping meals, consider cutting portions instead.    Diet combined with exercise helps maintain muscle while optimizing fat loss. Strength training is particularly important for building and maintaining muscle mass. Exercise helps reduce stress, increase energy, and improves fitness. Increasing exercise without diet control, however, may not burn enough calories to loose weight.       Start walking three days a week 10-20 minutes at a time    Work towards walking thirty minutes five days a week     Eventually, increase the speed of your walking for 1-2 minutes at time    In addition, we recommend that you review healthy lifestyles and methods for weight loss available through the National Institutes of Health patient information sites:  http://win.niddk.nih.gov/publications/index.htm    And look into health and wellness programs that may be available through your health insurance provider, employer, local community center, or [employer] club.    Weight management plan: Patient was referred to their PCP to discuss a diet and exercise plan.        Your Body mass index is 35.72 kg/(m^2).  Weight management is a personal decision.  If you are interested in exploring weight loss strategies, the following discussion covers the approaches that may be successful. Body mass index (BMI) is one way to tell whether you are at a healthy weight, overweight, or obese. It measures your weight in relation to your height.  A BMI of 18.5 to 24.9 is in the healthy range. A person with a BMI of 25 to 29.9 is considered overweight, and someone with a BMI of 30 or greater is considered obese. More than two-thirds of American adults are considered overweight or obese.  Being overweight or obese increases the risk for further weight gain. Excess weight may lead to heart disease and diabetes.  Creating and following plans for healthy  eating and physical activity may help you improve your health.  Weight control is part of healthy lifestyle and includes exercise, emotional health, and healthy eating habits. Careful eating habits lifelong are the mainstay of weight control. Though there are significant health benefits from weight loss, long-term weight loss with diet alone may be very difficult to achieve- studies show long-term success with dietary management in less than 10% of people. Attaining a healthy weight may be especially difficult to achieve in those with severe obesity. In some cases, medications, devices and surgical management might be considered.  What can you do?  If you are overweight or obese and are interested in methods for weight loss, you should discuss this with your provider.     Consider reducing daily calorie intake by 500 calories.     Keep a food journal.     Avoiding skipping meals, consider cutting portions instead.    Diet combined with exercise helps maintain muscle while optimizing fat loss. Strength training is particularly important for building and maintaining muscle mass. Exercise helps reduce stress, increase energy, and improves fitness. Increasing exercise without diet control, however, may not burn enough calories to loose weight.       Start walking three days a week 10-20 minutes at a time    Work towards walking thirty minutes five days a week     Eventually, increase the speed of your walking for 1-2 minutes at time    In addition, we recommend that you review healthy lifestyles and methods for weight loss available through the National Institutes of Health patient information sites:  http://win.niddk.nih.gov/publications/index.htm    And look into health and wellness programs that may be available through your health insurance provider, employer, local community center, or [employer].                Follow-ups after your visit        Follow-up notes from your care team     Return in about 2 months  (around 7/23/2017).      Your next 10 appointments already scheduled     May 24, 2017  9:00 AM CDT   PAP SETUP REPLACEMENT with DME SCHEDULE   [facility]    [address] [place] [id number] [phone]            Jul 26, 2017  2:00 PM CDT   (Arrive by 1:45 PM)   New Patient Visit with SHAHEED Mccormick CNP   [facility] ([facility])    [address] [place] [id number] [phone]            Sep 12, 2017 11:00 AM CDT   (Arrive by 10:45 AM)   RETURN GENERAL with SHEREE Tejada [facility] Ophthalmology ([facility])    [address] [place] [id number] [phone]              Who to contact     If you have questions or need follow up information about today's clinic visit or your schedule please contact [facility] directly at [phone].  Normal or non-critical lab and imaging results will be communicated to you by MIOTtechhart, letter or phone within 4 business days after the clinic has received the results. If you do not hear from us within 7 days, please contact the clinic through Fontselft or phone. If you have a critical or abnormal lab result, we will notify you by phone as soon as possible.  Submit refill requests through Mor.sl or call your pharmacy and they will forward the refill request to us. Please allow 3 business days for your refill to be completed.          Additional Information About Your Visit        Mor.sl Information     Mor.sl gives you secure access to your electronic health record. If you see a primary care provider, you can also send messages to your care team and make appointments. If you have questions, please call your primary care clinic.  If you do not have a primary care provider, please call [phone] and they will assist you.        Care  94 EveryWhere ID     This is your Care EveryWhere ID. This could be used by other organizations to access your [place] medical records  [id number]        Your Vitals Were     Pulse Respirations Height Pulse Oximetry BMI (Body Mass Index)       83 16 1.829 m (6') 96% 35.72 kg/m2        Blood Pressure from Last 3 Encounters:   05/23/17 129/77   04/03/17 126/75   03/20/17 125/72    Weight from Last 3 Encounters:   05/23/17 119.5 kg (263 lb 6.4 oz)   04/03/17 118.4 kg (261 lb)   03/20/17 118.8 kg (262 lb)              We Performed the Following     Comprehensive DME          Today's Medication Changes          These changes are accurate as of: 5/23/17 10:07 PM.  If you have any questions, ask your nurse or doctor.               These medicines have changed or have updated prescriptions.        Dose/Directions    ferrous fumarate 65 mg ([place]. FE)-Vitamin C 125 mg  MG Tabs tablet   Commonly known as:  VITRON C   This may have changed:    - how much to take  - when to take this  - additional instructions        One tab by mouth once daily in between meals   Quantity:  30 tablet   Refills:  2                Primary Care Provider Office Phone # Fax #    Jesus Boyle [phone]       [facility] [address] [facility] [address]        Thank you!     Thank you for choosing [facility], SLEEP STUDY  for your care. Our goal is always to provide you with excellent care. Hearing back from our patients is one way we can continue to improve our services. Please take a few minutes to complete the written survey that you may receive in the mail after your visit with us. Thank you!             Your Updated Medication List - Protect others around you: Learn how to safely use, store and throw away your medicines at www.disposemymeds.org.          This list is accurate as of: 5/23/17 10:07 PM.  Always use your most recent med list.                   Brand Name Dispense Instructions for use     * albuterol (2.5 MG/3ML) 0.083% neb solution      Take 1 vial by nebulization every 6 hours as needed for shortness of breath / dyspnea or wheezing       * VENTOLIN  (90 BASE) MCG/ACT Inhaler   Generic drug:  albuterol      Inhale 2 puffs into the lungs every 4 hours as needed for shortness of breath / dyspnea or wheezing       AMITIZA PO      Take 24 mcg by mouth 2 times daily (with meals)       * ARIPIPRAZOLE PO      Take 10 mg by mouth every evening       * ARIPiprazole 2 MG tablet    ABILIFY    30 tablet    Take 2 mg by mouth 2 times daily       CHANTIX PO      Take 1 mg by mouth 2 times daily       EUCERIN INTENSIVE REPAIR EX      Externally apply topically 2 times daily as needed       FAMOTIDINE PO      Take 20 mg by mouth 2 times daily       ferrous fumarate 65 mg ([place]. FE)-Vitamin C 125 mg  MG Tabs tablet    VITRON C    30 tablet    One tab by mouth once daily in between meals       glipiZIDE 5 MG tablet    GLUCOTROL    30 tablet    Take 1 tablet (5 mg) by mouth 2 times daily (before meals)       LIPITOR PO      Take 40 mg by mouth every evening       losartan 25 MG tablet    COZAAR    30 tablet    Take 1 tablet (25 mg) by mouth daily       magnesium oxide 400 (241.3 MG) MG tablet    MAG-OX    60 tablet    Take 1 tablet (400 mg) by mouth daily       metFORMIN 500 MG tablet    GLUCOPHAGE    60 tablet    Take 850 mg by mouth 2 times daily (with meals) 850 mg BID       metoprolol 50 MG tablet    LOPRESSOR    180 tablet    Take 100 mg by mouth 3 times daily       mirtazapine 45 MG tablet    REMERON    90 tablet    Take 45 mg by mouth At Bedtime       * SEROQUEL PO      Take 25 mg by mouth 2 times daily       * QUEtiapine 100 MG tablet    SEROquel    30 tablet    150 mg At Bedtime 150 mg every HS       senna-docusate 8.6-50 MG per tablet    SENOKOT-S;PERICOLACE     Take 1 tablet by mouth daily       TOPAMAX PO      Take 100 mg by mouth every evening       TRAZODONE HCL PO      Take 100 mg by mouth  At Bedtime       venlafaxine 150 MG 24 hr capsule    EFFEXOR-XR    30 capsule    Take 150 mg by mouth 2 times daily       * warfarin 7.5 MG tablet    COUMADIN     Take 7.5 mg by mouth daily       * warfarin 1 MG tablet    COUMADIN    30 tablet    Total 8.5 mg daily       * Notice:  This list has 8 medication(s) that are the same as other medications prescribed for you. Read the directions carefully, and ask your doctor or other care provider to review them with you.

## 2025-05-01 NOTE — ED PROVIDER NOTE - OBJECTIVE STATEMENT
53 y/o female Discoid Lupus, Hypertension, Asthma, CAD,  x3, Partial Hysterectomy (), chronic back pain s/p Laminectomy L4-L5 (), multiple abdominal surgeries w/ Dr. Borrego, most recently had SBO w/ lysis adhesions w/ Dr. Scherer 25 presents with a few days of epigastric pain/bloating associated w/ night sweats/chills. No recorded fevers, no vomiting, she is still having BM + passing flatus. No blood in stool. She feels like there is a fluctuant mass in her epigastric area.

## 2025-05-01 NOTE — ED PROVIDER NOTE - PROGRESS NOTE DETAILS
I have assumed care of this patient. I have fully participated in the care of this patient. I have made amendments to the documentation where appropriate and have supervised the ongoing plan of care enacted by the Fellow/Resident/ACP/Student.  JONY ROMOP surg returned page, to see patient paged surg Endorsed to Dr SHERMAN Mares  Surg recs pending  Rich Quarles MD, Facep

## 2025-05-01 NOTE — ED PROVIDER NOTE - NSFOLLOWUPINSTRUCTIONS_ED_ALL_ED_FT
1. You presented to the emergency department for evaluation of epigastric pain. Your evaluation in the emergency department included a physician evaluation. Your work-up did not reveal any findings indicating the need for admission to the hospital or any emergent interventions at this time.     2. It is recommended that you follow-up with your surgeon and PCP.    3. Please continue taking your regular medications as prescribed.     For pain you may take 1000mg ACETAMINOPHEN every 6-8 hours - as needed.  This is an over-the-counter medication - please read the instructions for use and warnings on the label. If you have any questions regarding its use, you may refer them to your local pharmacist.    4. PLEASE RETURN TO THE EMERGENCY DEPARTMENT IMMEDIATELY IF you develop any fevers not responding to over the counter medications, uncontrollable nausea and vomiting, an inability to tolerate eating and drinking, difficulty breathing, chest pain, a severe increase in your symptoms or pain, or any other new symptoms that concern you.

## 2025-05-02 VITALS
RESPIRATION RATE: 16 BRPM | HEART RATE: 75 BPM | DIASTOLIC BLOOD PRESSURE: 81 MMHG | SYSTOLIC BLOOD PRESSURE: 133 MMHG | TEMPERATURE: 99 F | OXYGEN SATURATION: 99 %

## 2025-05-02 NOTE — CONSULT NOTE ADULT - SUBJECTIVE AND OBJECTIVE BOX
COLORECTAL SURGERY CONSULT NOTE    HPI: 54F with a pmhx of Discoid Lupus, HTN, Asthma, CAD,  x3, Partial Hysterectomy (), chronic back pain s/p Laminectomy L4-L5 (), Umbilical Hernia Repair (2013), and Small Bowel Resection with Abdominal Wall Reconstruction, and Umbilical Hernia, now s/p exploratory laparotomy with lysis of adhesions for closed loop obstruction 25, presenting for evaluation of epigastric tightness. No nausea or vomiting. Passing flatus and having bowel movements.     In the ED, patient was afebrile, VSS, WBC WNL.     CT showed mildly increased soft tissue changes along the small bowel loops in the   midabdomen with possible mild small bowel wall thickening which is likely a combination of postsurgical changes of enteritis however close continued follow-up recommended to ensure resolution. No evidence for bowel obstructionStool-filled colon suggesting constipation.Postoperative changes along the anterior abdominal wall with interval increase in soft tissue stranding may represent postsurgical change   versus cellulitis. No rim-enhancing collection to suggest abscess is identified.      PMH/PSH: Umbilical hernia    Postoperative wound infection    Scar tissue    Discoid lupus    Asthma    GERD (gastroesophageal reflux disease)    Gastric ulcer    Diverticulosis    Herniated lumbar intervertebral disc    Chronic back pain    Metatarsal fracture    Fibroid uterus    Dry eyes    Small bowel perforation    Incisional hernia    S/P hernia repair    Scar tissue    S/P colonoscopy    S/P laminectomy    S/P tonsillectomy    History of     Metatarsal fracture    S/P MICHAEL (total abdominal hysterectomy)    S/P myomectomy    Benign Breast Biopsy    History of resection of small bowel        MEDS:    ALLERGIES: NKDA    REVIEW OF SYSTEMS: All ROS negative except as per HPI.  ____________________________________________    VITALS:T(C): 37, Max: 37.1 (05-01)  T(F): 98.6, Max: 98.7 (05-01)  HR: 75 (75 - 94)  BP: 133/81 (113/69 - 133/81)  BP(mean): 98 (94 - 98)  RR: 16 (16 - 20)  SpO2: 99% (98% - 99%) room air 21       PHYSICAL EXAM:  General: AAOx3, no acute distress.  Respiratory: breathing comfortably, no increased WOB   Abdomen: soft, nontender, nondistended, no rebound, no guarding  Extremities: Moves all four.   ____________________________________________    LABS:                      11.6  7.35 )-----------( 456    ( 01 May 2025 15:48 )             35.1  141  |  104  |  12  ----------------------------<  105[H]    (05-)  4.0   |  22  |  0.43[L]          Ca    9.6      05-01  Mg    x  Phos  x        LIVER FUNCTIONS - ( 01 May 2025 15:48 )  Alb: 4.3 g/dL / Pro: 6.9 g/dL / ALK PHOS: 86 U/L / ALT: 13 U/L / AST: 14 U/L / GGT: x        Urinalysis Basic - ( 01 May 2025 15:48 )    Color: x / Appearance: x / SG: x / pH: x  Gluc: 105 mg/dL / Ketone: x  / Bili: x / Urobili: x   Blood: x / Protein: x / Nitrite: x   Leuk Esterase: x / RBC: x / WBC x   Sq Epi: x / Non Sq Epi: x / Bacteria: x      ____________________________________________    RADIOLOGY & ADDITIONAL STUDIES:

## 2025-05-02 NOTE — CONSULT NOTE ADULT - ASSESSMENT
54F with a pmhx of Discoid Lupus, HTN, Asthma, CAD,  x3, Partial Hysterectomy (), chronic back pain s/p Laminectomy L4-L5 (), Umbilical Hernia Repair (2013), and Small Bowel Resection with Abdominal Wall Reconstruction with Dr. Borrego, now s/p exploratory laparotomy with lysis of adhesions for closed loop obstruction 25, presenting for evaluation of epigastric tightness. No nausea or vomiting. Passing flatus and having bowel movements. CT with PO showed post surgical changes, no bowel obstruction, no abscess or collections. Surgery consulted for evaluation.     Recommendations:   - No acute surgical intervention, exam benign  - Dispo per ED         Discussed with CRS fellow Dr. NATHAN Ya     [facility] Surgery   [id number]

## 2025-05-06 LAB
CULTURE RESULTS: SIGNIFICANT CHANGE UP
CULTURE RESULTS: SIGNIFICANT CHANGE UP
SPECIMEN SOURCE: SIGNIFICANT CHANGE UP
SPECIMEN SOURCE: SIGNIFICANT CHANGE UP

## 2025-05-21 ENCOUNTER — APPOINTMENT (OUTPATIENT)
Dept: COLORECTAL SURGERY | Facility: CLINIC | Age: 55
End: 2025-05-21
Payer: COMMERCIAL

## 2025-05-21 PROCEDURE — 99024 POSTOP FOLLOW-UP VISIT: CPT

## 2025-07-31 ENCOUNTER — LABORATORY RESULT (OUTPATIENT)
Age: 55
End: 2025-07-31

## 2025-07-31 ENCOUNTER — APPOINTMENT (OUTPATIENT)
Dept: OBGYN | Facility: CLINIC | Age: 55
End: 2025-07-31

## 2025-07-31 VITALS
HEIGHT: 61 IN | BODY MASS INDEX: 27.94 KG/M2 | WEIGHT: 148 LBS | SYSTOLIC BLOOD PRESSURE: 124 MMHG | DIASTOLIC BLOOD PRESSURE: 78 MMHG

## 2025-07-31 DIAGNOSIS — M25.50 PAIN IN UNSPECIFIED JOINT: ICD-10-CM

## 2025-07-31 DIAGNOSIS — N89.8 OTHER SPECIFIED NONINFLAMMATORY DISORDERS OF VAGINA: ICD-10-CM

## 2025-07-31 PROCEDURE — 99214 OFFICE O/P EST MOD 30 MIN: CPT

## 2025-07-31 PROCEDURE — 36415 COLL VENOUS BLD VENIPUNCTURE: CPT

## 2025-07-31 RX ORDER — CLOTRIMAZOLE AND BETAMETHASONE DIPROPIONATE 10; .5 MG/G; MG/G
1-0.05 CREAM TOPICAL TWICE DAILY
Qty: 1 | Refills: 1 | Status: ACTIVE | COMMUNITY
Start: 2025-07-31 | End: 1900-01-01

## 2025-08-03 LAB
APPEARANCE: CLEAR
BILIRUBIN URINE: NEGATIVE
BLOOD URINE: NEGATIVE
COLOR: YELLOW
DHEA-S SERPL-MCNC: 21.8 UG/DL
ESTRADIOL SERPL-MCNC: <5 PG/ML
FSH SERPL-MCNC: 146 IU/L
GLUCOSE QUALITATIVE U: NEGATIVE
KETONES URINE: NEGATIVE
LEUKOCYTE ESTERASE URINE: NEGATIVE
LH SERPL-ACNC: 68.4 IU/L
LUPUS ANTICOAGULANT CASCADE REFLEX: NORMAL
NITRITE URINE: NEGATIVE
PH URINE: 5.5
PROGEST SERPL-MCNC: 0.1 NG/ML
PROLACTIN SERPL-MCNC: 13.5 NG/ML
PROTEIN URINE: NEGATIVE
SHBG SERPL-SCNC: 76.9 NMOL/L
SPECIFIC GRAVITY URINE: 1.02
TESTOST FREE SERPL-MCNC: 0.5 PG/ML
TESTOST SERPL-MCNC: <2.5 NG/DL
TSH SERPL-ACNC: 1.6 UIU/ML
UROBILINOGEN URINE: 0.2 (ref 0.2–?)

## 2025-08-05 LAB
ANA TITR SER: ABNORMAL
ANA TITR SER: ABNORMAL

## 2025-08-06 LAB — ANDROST SERPL-MCNC: 29 NG/DL

## (undated) DEVICE — DRSG OPSITE 13.75 X 4"

## (undated) DEVICE — GOWN TRIMAX LG

## (undated) DEVICE — GLV 8 PROTEXIS (WHITE)

## (undated) DEVICE — WARMING BLANKET LOWER ADULT

## (undated) DEVICE — DRAPE TOWEL BLUE 17" X 24"

## (undated) DEVICE — STAPLER SKIN VISI-STAT 35 WIDE

## (undated) DEVICE — DRAPE INSTRUMENT POUCH 6.75" X 11"

## (undated) DEVICE — LAP PAD 18 X 18"

## (undated) DEVICE — SUT PDS II 1 48" TP-1

## (undated) DEVICE — VISITEC 4X4

## (undated) DEVICE — Device

## (undated) DEVICE — PACK BASIN SPECIAL PROCEDURE

## (undated) DEVICE — BLADE SCALPEL SAFETYLOCK #15

## (undated) DEVICE — GLV 7 PROTEXIS (WHITE)

## (undated) DEVICE — LIGASURE IMPACT

## (undated) DEVICE — DRAPE MAYO STAND 30"

## (undated) DEVICE — SUT SOFSILK 2-0 18" V-20 (POP-OFF)

## (undated) DEVICE — STAPLER COVIDIEN ENDO GIA STANDARD HANDLE

## (undated) DEVICE — PREP CHLORAPREP HI-LITE ORANGE 26ML

## (undated) DEVICE — VENODYNE/SCD SLEEVE CALF MEDIUM

## (undated) DEVICE — GLV 8.5 PROTEXIS (WHITE)

## (undated) DEVICE — SOL IRR POUR NS 0.9% 500ML

## (undated) DEVICE — PREP BETADINE KIT

## (undated) DEVICE — FOLEY TRAY 16FR 5CC LTX UMETER CLOSED

## (undated) DEVICE — GLV 7.5 PROTEXIS (WHITE)

## (undated) DEVICE — SPECIMEN CONTAINER 100ML

## (undated) DEVICE — BLADE SCALPEL SAFETYLOCK #10

## (undated) DEVICE — SOL IRR POUR H2O 250ML

## (undated) DEVICE — ELCTR BOVIE PENCIL SMOKE EVACUATION

## (undated) DEVICE — WARMING BLANKET UPPER ADULT

## (undated) DEVICE — MARKING PEN W RULER

## (undated) DEVICE — PACK MAJOR ABDOMINAL SUPINE

## (undated) DEVICE — MEDICATION LABELS W MARKER

## (undated) DEVICE — SUT SOFSILK 3-0 18" V-20 (POP-OFF)

## (undated) DEVICE — POSITIONER FOAM EGG CRATE ULNAR 2PCS (PINK)

## (undated) DEVICE — GLV 6.5 PROTEXIS (WHITE)